# Patient Record
Sex: FEMALE | Race: WHITE | NOT HISPANIC OR LATINO
[De-identification: names, ages, dates, MRNs, and addresses within clinical notes are randomized per-mention and may not be internally consistent; named-entity substitution may affect disease eponyms.]

---

## 2017-03-30 ENCOUNTER — RX ONLY (OUTPATIENT)
Age: 42
Setting detail: RX ONLY
End: 2017-03-30

## 2017-03-30 RX ORDER — TRETINOIN 0.5 MG/G
CREAM TOPICAL
Qty: 1 | Refills: 2

## 2018-01-02 PROBLEM — F41.9 ANXIETY: Status: ACTIVE | Noted: 2018-01-02

## 2018-01-02 PROBLEM — F33.9 RECURRENT DEPRESSION (HCC): Status: ACTIVE | Noted: 2018-01-02

## 2020-02-06 PROBLEM — R10.11 RUQ PAIN: Status: ACTIVE | Noted: 2020-02-06

## 2020-02-06 PROBLEM — K80.10 CHRONIC CALCULOUS CHOLECYSTITIS: Status: ACTIVE | Noted: 2020-02-06

## 2020-02-06 PROBLEM — R74.8 ELEVATED LIVER ENZYMES: Status: ACTIVE | Noted: 2020-02-06

## 2020-02-06 PROBLEM — R93.2 ABNORMAL LIVER ULTRASOUND: Status: ACTIVE | Noted: 2020-02-06

## 2020-03-23 ENCOUNTER — HOSPITAL ENCOUNTER (INPATIENT)
Age: 45
LOS: 4 days | Discharge: HOME | DRG: 418 | End: 2020-03-27
Attending: STUDENT IN AN ORGANIZED HEALTH CARE EDUCATION/TRAINING PROGRAM | Admitting: INTERNAL MEDICINE
Payer: COMMERCIAL

## 2020-03-23 ENCOUNTER — APPOINTMENT (OUTPATIENT)
Dept: CT IMAGING | Age: 45
DRG: 418 | End: 2020-03-23
Attending: STUDENT IN AN ORGANIZED HEALTH CARE EDUCATION/TRAINING PROGRAM
Payer: COMMERCIAL

## 2020-03-23 ENCOUNTER — ANESTHESIA EVENT (OUTPATIENT)
Dept: ENDOSCOPY | Age: 45
DRG: 418 | End: 2020-03-23
Payer: COMMERCIAL

## 2020-03-23 ENCOUNTER — ANESTHESIA (OUTPATIENT)
Dept: ENDOSCOPY | Age: 45
DRG: 418 | End: 2020-03-23
Payer: COMMERCIAL

## 2020-03-23 ENCOUNTER — APPOINTMENT (OUTPATIENT)
Dept: ULTRASOUND IMAGING | Age: 45
DRG: 418 | End: 2020-03-23
Attending: STUDENT IN AN ORGANIZED HEALTH CARE EDUCATION/TRAINING PROGRAM
Payer: COMMERCIAL

## 2020-03-23 ENCOUNTER — APPOINTMENT (OUTPATIENT)
Dept: GENERAL RADIOLOGY | Age: 45
DRG: 418 | End: 2020-03-23
Attending: INTERNAL MEDICINE
Payer: COMMERCIAL

## 2020-03-23 DIAGNOSIS — K85.10 ACUTE BILIARY PANCREATITIS, UNSPECIFIED COMPLICATION STATUS: Primary | ICD-10-CM

## 2020-03-23 DIAGNOSIS — R11.0 NAUSEA WITHOUT VOMITING: ICD-10-CM

## 2020-03-23 DIAGNOSIS — K80.50 CHOLEDOCHOLITHIASIS: ICD-10-CM

## 2020-03-23 LAB
ALBUMIN SERPL-MCNC: 4.3 G/DL (ref 3.5–5)
ALBUMIN/GLOB SERPL: 1 {RATIO} (ref 1.2–3.5)
ALP SERPL-CCNC: 378 U/L (ref 50–136)
ALT SERPL-CCNC: 1279 U/L (ref 12–65)
ANION GAP SERPL CALC-SCNC: 8 MMOL/L (ref 7–16)
AST SERPL-CCNC: 1019 U/L (ref 15–37)
BASOPHILS # BLD: 0 K/UL (ref 0–0.2)
BASOPHILS NFR BLD: 0 % (ref 0–2)
BILIRUB SERPL-MCNC: 2.9 MG/DL (ref 0.2–1.1)
BUN SERPL-MCNC: 11 MG/DL (ref 6–23)
CALCIUM SERPL-MCNC: 9.4 MG/DL (ref 8.3–10.4)
CHLORIDE SERPL-SCNC: 103 MMOL/L (ref 98–107)
CO2 SERPL-SCNC: 28 MMOL/L (ref 21–32)
CREAT SERPL-MCNC: 0.96 MG/DL (ref 0.6–1)
DIFFERENTIAL METHOD BLD: ABNORMAL
EOSINOPHIL # BLD: 0 K/UL (ref 0–0.8)
EOSINOPHIL NFR BLD: 0 % (ref 0.5–7.8)
ERYTHROCYTE [DISTWIDTH] IN BLOOD BY AUTOMATED COUNT: 12.2 % (ref 11.9–14.6)
GLOBULIN SER CALC-MCNC: 4.1 G/DL (ref 2.3–3.5)
GLUCOSE SERPL-MCNC: 185 MG/DL (ref 65–100)
HCT VFR BLD AUTO: 42.5 % (ref 35.8–46.3)
HGB BLD-MCNC: 13.9 G/DL (ref 11.7–15.4)
IMM GRANULOCYTES # BLD AUTO: 0.1 K/UL (ref 0–0.5)
IMM GRANULOCYTES NFR BLD AUTO: 1 % (ref 0–5)
LIPASE SERPL-CCNC: ABNORMAL U/L (ref 73–393)
LYMPHOCYTES # BLD: 0.9 K/UL (ref 0.5–4.6)
LYMPHOCYTES NFR BLD: 7 % (ref 13–44)
MAGNESIUM SERPL-MCNC: 2.2 MG/DL (ref 1.8–2.4)
MCH RBC QN AUTO: 28.9 PG (ref 26.1–32.9)
MCHC RBC AUTO-ENTMCNC: 32.7 G/DL (ref 31.4–35)
MCV RBC AUTO: 88.4 FL (ref 79.6–97.8)
MONOCYTES # BLD: 0.6 K/UL (ref 0.1–1.3)
MONOCYTES NFR BLD: 4 % (ref 4–12)
NEUTS SEG # BLD: 11.8 K/UL (ref 1.7–8.2)
NEUTS SEG NFR BLD: 89 % (ref 43–78)
NRBC # BLD: 0 K/UL (ref 0–0.2)
PLATELET # BLD AUTO: 424 K/UL (ref 150–450)
PMV BLD AUTO: 9.3 FL (ref 9.4–12.3)
POTASSIUM SERPL-SCNC: 3.8 MMOL/L (ref 3.5–5.1)
PROT SERPL-MCNC: 8.4 G/DL (ref 6.3–8.2)
RBC # BLD AUTO: 4.81 M/UL (ref 4.05–5.2)
SODIUM SERPL-SCNC: 139 MMOL/L (ref 136–145)
WBC # BLD AUTO: 13.4 K/UL (ref 4.3–11.1)

## 2020-03-23 PROCEDURE — 76705 ECHO EXAM OF ABDOMEN: CPT

## 2020-03-23 PROCEDURE — 74011000258 HC RX REV CODE- 258: Performed by: STUDENT IN AN ORGANIZED HEALTH CARE EDUCATION/TRAINING PROGRAM

## 2020-03-23 PROCEDURE — 76060000032 HC ANESTHESIA 0.5 TO 1 HR: Performed by: INTERNAL MEDICINE

## 2020-03-23 PROCEDURE — 83690 ASSAY OF LIPASE: CPT

## 2020-03-23 PROCEDURE — 81003 URINALYSIS AUTO W/O SCOPE: CPT

## 2020-03-23 PROCEDURE — BF141ZZ FLUOROSCOPY OF GALLBLADDER, BILE DUCTS AND PANCREATIC DUCTS USING LOW OSMOLAR CONTRAST: ICD-10-PCS | Performed by: INTERNAL MEDICINE

## 2020-03-23 PROCEDURE — 74011000250 HC RX REV CODE- 250: Performed by: NURSE ANESTHETIST, CERTIFIED REGISTERED

## 2020-03-23 PROCEDURE — 83735 ASSAY OF MAGNESIUM: CPT

## 2020-03-23 PROCEDURE — 77030012595 HC SPHNTOM BILI BSC -D: Performed by: INTERNAL MEDICINE

## 2020-03-23 PROCEDURE — 77030007288 HC DEV LOK BILI BSC -A: Performed by: INTERNAL MEDICINE

## 2020-03-23 PROCEDURE — 74011250636 HC RX REV CODE- 250/636: Performed by: NURSE ANESTHETIST, CERTIFIED REGISTERED

## 2020-03-23 PROCEDURE — 77030037088 HC TUBE ENDOTRACH ORAL NSL COVD-A: Performed by: ANESTHESIOLOGY

## 2020-03-23 PROCEDURE — 77030009038 HC CATH BILI STN RTVR BSC -C: Performed by: INTERNAL MEDICINE

## 2020-03-23 PROCEDURE — 74011250636 HC RX REV CODE- 250/636: Performed by: INTERNAL MEDICINE

## 2020-03-23 PROCEDURE — 74011250636 HC RX REV CODE- 250/636: Performed by: STUDENT IN AN ORGANIZED HEALTH CARE EDUCATION/TRAINING PROGRAM

## 2020-03-23 PROCEDURE — 96375 TX/PRO/DX INJ NEW DRUG ADDON: CPT

## 2020-03-23 PROCEDURE — 80053 COMPREHEN METABOLIC PANEL: CPT

## 2020-03-23 PROCEDURE — 96365 THER/PROPH/DIAG IV INF INIT: CPT

## 2020-03-23 PROCEDURE — 77030040361 HC SLV COMPR DVT MDII -B: Performed by: INTERNAL MEDICINE

## 2020-03-23 PROCEDURE — 76040000026: Performed by: INTERNAL MEDICINE

## 2020-03-23 PROCEDURE — 99284 EMERGENCY DEPT VISIT MOD MDM: CPT

## 2020-03-23 PROCEDURE — 77030039425 HC BLD LARYNG TRULITE DISP TELE -A: Performed by: ANESTHESIOLOGY

## 2020-03-23 PROCEDURE — 74011636320 HC RX REV CODE- 636/320: Performed by: STUDENT IN AN ORGANIZED HEALTH CARE EDUCATION/TRAINING PROGRAM

## 2020-03-23 PROCEDURE — 85025 COMPLETE CBC W/AUTO DIFF WBC: CPT

## 2020-03-23 PROCEDURE — 74011250637 HC RX REV CODE- 250/637: Performed by: INTERNAL MEDICINE

## 2020-03-23 PROCEDURE — 65270000029 HC RM PRIVATE

## 2020-03-23 PROCEDURE — 74177 CT ABD & PELVIS W/CONTRAST: CPT

## 2020-03-23 PROCEDURE — 74011636320 HC RX REV CODE- 636/320: Performed by: INTERNAL MEDICINE

## 2020-03-23 PROCEDURE — 74330 X-RAY BILE/PANC ENDOSCOPY: CPT

## 2020-03-23 PROCEDURE — 0FC98ZZ EXTIRPATION OF MATTER FROM COMMON BILE DUCT, VIA NATURAL OR ARTIFICIAL OPENING ENDOSCOPIC: ICD-10-PCS | Performed by: INTERNAL MEDICINE

## 2020-03-23 RX ORDER — SODIUM CHLORIDE 0.9 % (FLUSH) 0.9 %
10 SYRINGE (ML) INJECTION
Status: COMPLETED | OUTPATIENT
Start: 2020-03-23 | End: 2020-03-23

## 2020-03-23 RX ORDER — LORAZEPAM 2 MG/ML
1 INJECTION INTRAMUSCULAR
Status: DISCONTINUED | OUTPATIENT
Start: 2020-03-23 | End: 2020-03-27 | Stop reason: HOSPADM

## 2020-03-23 RX ORDER — SUCCINYLCHOLINE CHLORIDE 20 MG/ML
INJECTION INTRAMUSCULAR; INTRAVENOUS AS NEEDED
Status: DISCONTINUED | OUTPATIENT
Start: 2020-03-23 | End: 2020-03-23 | Stop reason: HOSPADM

## 2020-03-23 RX ORDER — SODIUM CHLORIDE 0.9 % (FLUSH) 0.9 %
5-40 SYRINGE (ML) INJECTION EVERY 8 HOURS
Status: DISCONTINUED | OUTPATIENT
Start: 2020-03-23 | End: 2020-03-27 | Stop reason: HOSPADM

## 2020-03-23 RX ORDER — SODIUM CHLORIDE, SODIUM LACTATE, POTASSIUM CHLORIDE, CALCIUM CHLORIDE 600; 310; 30; 20 MG/100ML; MG/100ML; MG/100ML; MG/100ML
1000 INJECTION, SOLUTION INTRAVENOUS CONTINUOUS
Status: DISCONTINUED | OUTPATIENT
Start: 2020-03-23 | End: 2020-03-23 | Stop reason: HOSPADM

## 2020-03-23 RX ORDER — HYDROMORPHONE HYDROCHLORIDE 1 MG/ML
1 INJECTION, SOLUTION INTRAMUSCULAR; INTRAVENOUS; SUBCUTANEOUS
Status: DISCONTINUED | OUTPATIENT
Start: 2020-03-23 | End: 2020-03-25

## 2020-03-23 RX ORDER — SODIUM CHLORIDE 0.9 % (FLUSH) 0.9 %
5-40 SYRINGE (ML) INJECTION AS NEEDED
Status: DISCONTINUED | OUTPATIENT
Start: 2020-03-23 | End: 2020-03-27 | Stop reason: HOSPADM

## 2020-03-23 RX ORDER — MORPHINE SULFATE 4 MG/ML
4 INJECTION INTRAVENOUS
Status: COMPLETED | OUTPATIENT
Start: 2020-03-23 | End: 2020-03-23

## 2020-03-23 RX ORDER — PANTOPRAZOLE SODIUM 40 MG/1
40 TABLET, DELAYED RELEASE ORAL
Status: DISCONTINUED | OUTPATIENT
Start: 2020-03-23 | End: 2020-03-27 | Stop reason: HOSPADM

## 2020-03-23 RX ORDER — SODIUM CHLORIDE 9 MG/ML
100 INJECTION, SOLUTION INTRAVENOUS CONTINUOUS
Status: DISCONTINUED | OUTPATIENT
Start: 2020-03-23 | End: 2020-03-24

## 2020-03-23 RX ORDER — AMOXICILLIN 250 MG
1 CAPSULE ORAL DAILY
Status: DISCONTINUED | OUTPATIENT
Start: 2020-03-24 | End: 2020-03-27 | Stop reason: HOSPADM

## 2020-03-23 RX ORDER — METRONIDAZOLE 500 MG/100ML
500 INJECTION, SOLUTION INTRAVENOUS ONCE
Status: COMPLETED | OUTPATIENT
Start: 2020-03-23 | End: 2020-03-23

## 2020-03-23 RX ORDER — ONDANSETRON 2 MG/ML
4 INJECTION INTRAMUSCULAR; INTRAVENOUS
Status: DISCONTINUED | OUTPATIENT
Start: 2020-03-23 | End: 2020-03-27 | Stop reason: HOSPADM

## 2020-03-23 RX ORDER — DEXAMETHASONE SODIUM PHOSPHATE 4 MG/ML
INJECTION, SOLUTION INTRA-ARTICULAR; INTRALESIONAL; INTRAMUSCULAR; INTRAVENOUS; SOFT TISSUE AS NEEDED
Status: DISCONTINUED | OUTPATIENT
Start: 2020-03-23 | End: 2020-03-23 | Stop reason: HOSPADM

## 2020-03-23 RX ORDER — HYDROMORPHONE HYDROCHLORIDE 1 MG/ML
1 INJECTION, SOLUTION INTRAMUSCULAR; INTRAVENOUS; SUBCUTANEOUS
Status: COMPLETED | OUTPATIENT
Start: 2020-03-23 | End: 2020-03-23

## 2020-03-23 RX ORDER — PROPOFOL 10 MG/ML
INJECTION, EMULSION INTRAVENOUS AS NEEDED
Status: DISCONTINUED | OUTPATIENT
Start: 2020-03-23 | End: 2020-03-23 | Stop reason: HOSPADM

## 2020-03-23 RX ORDER — ONDANSETRON 2 MG/ML
4 INJECTION INTRAMUSCULAR; INTRAVENOUS
Status: COMPLETED | OUTPATIENT
Start: 2020-03-23 | End: 2020-03-23

## 2020-03-23 RX ORDER — FENTANYL CITRATE 50 UG/ML
INJECTION, SOLUTION INTRAMUSCULAR; INTRAVENOUS AS NEEDED
Status: DISCONTINUED | OUTPATIENT
Start: 2020-03-23 | End: 2020-03-23 | Stop reason: HOSPADM

## 2020-03-23 RX ORDER — LIDOCAINE HYDROCHLORIDE 20 MG/ML
INJECTION, SOLUTION EPIDURAL; INFILTRATION; INTRACAUDAL; PERINEURAL AS NEEDED
Status: DISCONTINUED | OUTPATIENT
Start: 2020-03-23 | End: 2020-03-23 | Stop reason: HOSPADM

## 2020-03-23 RX ORDER — ONDANSETRON 2 MG/ML
INJECTION INTRAMUSCULAR; INTRAVENOUS AS NEEDED
Status: DISCONTINUED | OUTPATIENT
Start: 2020-03-23 | End: 2020-03-23 | Stop reason: HOSPADM

## 2020-03-23 RX ADMIN — Medication 10 ML: at 22:49

## 2020-03-23 RX ADMIN — ONDANSETRON 4 MG: 2 INJECTION INTRAMUSCULAR; INTRAVENOUS at 10:00

## 2020-03-23 RX ADMIN — HYDROMORPHONE HYDROCHLORIDE 1 MG: 1 INJECTION, SOLUTION INTRAMUSCULAR; INTRAVENOUS; SUBCUTANEOUS at 12:54

## 2020-03-23 RX ADMIN — METRONIDAZOLE 500 MG: 500 INJECTION, SOLUTION INTRAVENOUS at 16:14

## 2020-03-23 RX ADMIN — FENTANYL CITRATE 50 MCG: 50 INJECTION INTRAMUSCULAR; INTRAVENOUS at 14:19

## 2020-03-23 RX ADMIN — IOPAMIDOL 5 ML: 755 INJECTION, SOLUTION INTRAVENOUS at 14:32

## 2020-03-23 RX ADMIN — SODIUM CHLORIDE 100 ML: 900 INJECTION, SOLUTION INTRAVENOUS at 11:44

## 2020-03-23 RX ADMIN — SODIUM CHLORIDE, SODIUM LACTATE, POTASSIUM CHLORIDE, AND CALCIUM CHLORIDE 1000 ML: 600; 310; 30; 20 INJECTION, SOLUTION INTRAVENOUS at 13:53

## 2020-03-23 RX ADMIN — ONDANSETRON 4 MG: 2 INJECTION INTRAMUSCULAR; INTRAVENOUS at 14:19

## 2020-03-23 RX ADMIN — MORPHINE SULFATE 4 MG: 4 INJECTION INTRAVENOUS at 10:00

## 2020-03-23 RX ADMIN — CEFTRIAXONE 2 G: 2 INJECTION, POWDER, FOR SOLUTION INTRAMUSCULAR; INTRAVENOUS at 13:10

## 2020-03-23 RX ADMIN — LIDOCAINE HYDROCHLORIDE 100 MG: 20 INJECTION, SOLUTION EPIDURAL; INFILTRATION; INTRACAUDAL; PERINEURAL at 14:10

## 2020-03-23 RX ADMIN — SUCCINYLCHOLINE CHLORIDE 140 MG: 20 INJECTION, SOLUTION INTRAMUSCULAR; INTRAVENOUS at 14:10

## 2020-03-23 RX ADMIN — FENTANYL CITRATE 50 MCG: 50 INJECTION INTRAMUSCULAR; INTRAVENOUS at 14:10

## 2020-03-23 RX ADMIN — PROPOFOL 150 MG: 10 INJECTION, EMULSION INTRAVENOUS at 14:10

## 2020-03-23 RX ADMIN — SODIUM CHLORIDE 1000 ML: 900 INJECTION, SOLUTION INTRAVENOUS at 10:00

## 2020-03-23 RX ADMIN — Medication 10 ML: at 11:44

## 2020-03-23 RX ADMIN — DEXAMETHASONE SODIUM PHOSPHATE 4 MG: 4 INJECTION, SOLUTION INTRAMUSCULAR; INTRAVENOUS at 14:19

## 2020-03-23 RX ADMIN — LORAZEPAM 1 MG: 2 INJECTION, SOLUTION INTRAMUSCULAR; INTRAVENOUS at 22:59

## 2020-03-23 RX ADMIN — Medication 10 ML: at 16:31

## 2020-03-23 RX ADMIN — IOPAMIDOL 100 ML: 755 INJECTION, SOLUTION INTRAVENOUS at 11:44

## 2020-03-23 RX ADMIN — PANTOPRAZOLE SODIUM 40 MG: 40 TABLET, DELAYED RELEASE ORAL at 18:54

## 2020-03-23 RX ADMIN — SODIUM CHLORIDE 200 ML/HR: 900 INJECTION, SOLUTION INTRAVENOUS at 16:14

## 2020-03-23 NOTE — PROGRESS NOTES
TRANSFER - IN REPORT:    Verbal report received from April, RN (name) on Rodrigo Natarajan  being received from ER room 17 (unit) for ordered procedure      Report consisted of patients Situation, Background, Assessment and   Recommendations(SBAR). Information from the following report(s) SBAR was reviewed with the receiving nurse. Opportunity for questions and clarification was provided. Awaiting transport to bring pt to the GI Lab at this time.

## 2020-03-23 NOTE — PROCEDURES
Gastroenterology Procedure Note           Procedure:  Endoscopic Retrograde Cholangiopancreatogram    Date of Procedure:  3/23/2020    Patient:  Charito Mckeon     1975    Sedation:  General anesthesia    Pre-Procedure Physical Exam:    Mental status:  alert and oriented  Airway:  normal oropharyngeal airway and neck mobility  CV:  regular rate and rhythm  Respiratory:  clear to auscultation    Procedure:  A History and Physical has been performed, and patient medication allergies have been  reviewed. Risks of pancreatitis, perforation, hemorrhage, adverse drug reaction, and aspiration were discussed. After obtaining informed consent, the patient was sedated, intubated and placed in prone position on the fluoroscopy table. The FPR215 duodenoscope was passed under indirect technique to the oropharynx and gently passed into the duodenum. Only partial views of the stomach and duodenum were obtained. After the procedure, the patient was taken to the recovery area in stable condition. Findings:     AMPULLA: Normal, small. BILE DUCT: This was cannulated using a Jagtome RX 44 sphincterotome over a 0.035 wire. This allowed prompt biliary cannulation with a wire-guided technique. Initial cholangiogram revealed 2 small filling defects c/w stones in the proximal common bile duct. A biliary sphincterotomy was performed over a guidewire without complications. The tome was exchanged for a 9-12 mm extraction balloon. Multiple sweeps were performed and the stones were removed. Occlusion cholangiogram revealed no further filling defects and a mildly dilated CBD. PANCREATIC DUCT: Pancreatogram was intentionally not performed. Specimen:  No    Estimated Blood Loss:  Minimal    Implant:  Non3           Impression:  1. Choledocholithiasis      Plan:  1. Treat for pancreatitis  2. CCY  3. IVF  4.  Pain control      Signed:  Angelica Soler MD  3/23/2020  2:35 PM

## 2020-03-23 NOTE — PROGRESS NOTES
TRANSFER - IN REPORT:    Verbal report received from Shar Wright RN on [employer]  being received from PACU for routine post-op procedure. Report consisted of patients Situation, Background, Assessment and   Recommendations(SBAR). Information from the following report(s) SBAR was reviewed with the receiving nurse. Opportunity for questions and clarification was provided. Assessment completed upon patients arrival to unit and care assumed.

## 2020-03-23 NOTE — PROGRESS NOTES
03/23/20 1614   Dual Skin Pressure Injury Assessment   Dual Skin Pressure Injury Assessment WDL   Second Care Provider (Based on [address] [place] [address]) Karen Avina RN     Dual skin assessment completed.  No scars, lacerations, abrasion, rash, or redness noted

## 2020-03-23 NOTE — ANESTHESIA PREPROCEDURE EVALUATION
Relevant Problems   No relevant active problems       Anesthetic History               Review of Systems / Medical History  Patient summary reviewed and pertinent labs reviewed    Pulmonary                   Neuro/Psych              Cardiovascular                  Exercise tolerance: >4 METS     GI/Hepatic/Renal     GERD: well controlled           Endo/Other             Other Findings              Physical Exam    Airway  Mallampati: I  TM Distance: > 6 cm  Neck ROM: normal range of motion   Mouth opening: Normal     Cardiovascular  Regular rate and rhythm,  S1 and S2 normal,  no murmur, click, rub, or gallop  Rhythm: regular  Rate: normal         Dental  No notable dental hx       Pulmonary  Breath sounds clear to auscultation               Abdominal         Other Findings            Anesthetic Plan    ASA: 1  Anesthesia type: general            Anesthetic plan and risks discussed with: Patient

## 2020-03-23 NOTE — CONSULTS
[facility] Consult Note       Referring Physician:  Ryan Spencer    Consult Date:  3/23/2020    Admit Date:  3/23/2020    Chief Complaint:  Abdominal pain     Subjective:     History of Present Illness:  Patient is a 38 y.o. female who is seen in consultation at the request of Dr. Ryan Spencer for abdominal pain. Pain began yesterday. Pain is periumbilical in location. Pain described as sharp. Pain associated with n/v. Pain quantified as severe. Associated with lightening of stool. Associated with loss of appetite as well. No history of pancreatitis. No recent medication changes. We are asked to evaluate her further. PMH:  Past Medical History:   Diagnosis Date    Anxiety     Depression     Insomnia        PSH:  History reviewed. No pertinent surgical history. Allergies:  No Known Allergies    Home Medications:  Prior to Admission medications    Medication Sig Start Date End Date Taking? Authorizing Provider   FLUoxetine (PROZAC) 10 mg capsule Take  by mouth daily. Provider, Historical   mirtazapine (REMERON) 30 mg tablet Take  by mouth nightly. Provider, Historical   atorvastatin (LIPITOR) 40 mg tablet Take  by mouth daily. Provider, Historical   zolpidem (AMBIEN) 10 mg tablet Take 1 Tab by mouth nightly as needed for Sleep. Max Daily Amount: 10 mg. 1/22/18   Reny Goel MD   LORazepam (ATIVAN) 1 mg tablet Take 1 Tab by mouth two (2) times daily as needed for Anxiety. Max Daily Amount: 2 mg. 1/3/18   Reny Goel MD       [facility] Medications:  Current Facility-Administered Medications   Medication Dose Route Frequency    HYDROmorphone (PF) (DILAUDID) injection 1 mg  1 mg IntraVENous NOW    piperacillin-tazobactam (ZOSYN) 4.5 g in 0.9% sodium chloride (MBP/ADV) 100 mL  4.5 g IntraVENous NOW     Current Outpatient Medications   Medication Sig    FLUoxetine (PROZAC) 10 mg capsule Take  by mouth daily.  mirtazapine (REMERON) 30 mg tablet Take  by mouth nightly.     atorvastatin (LIPITOR) 40 mg tablet Take  by mouth daily.  zolpidem (AMBIEN) 10 mg tablet Take 1 Tab by mouth nightly as needed for Sleep. Max Daily Amount: 10 mg.    LORazepam (ATIVAN) 1 mg tablet Take 1 Tab by mouth two (2) times daily as needed for Anxiety. Max Daily Amount: 2 mg. Social History:  Social History     Tobacco Use    Smoking status: Never Smoker    Smokeless tobacco: Never Used   Substance Use Topics    Alcohol use: No     Pt denies any history of IV drug use, blood transfusions, or tattoos. Family History:  History reviewed. No pertinent family history. Review of Systems:  Review of Systems - General ROS: negative for - chills, hot flashes or night sweats  Hematological and Lymphatic ROS: negative for - bleeding problems, blood transfusions or fatigue  Cardiovascular ROS: negative for - chest pain, irregular heartbeat or loss of consciousness  Gastrointestinal ROS: positive for - abdominal pain, appetite loss and nausea/vomiting  negative for - diarrhea, hematemesis or melena  Genito-Urinary ROS: no dysuria, trouble voiding, or hematuria  Dermatological ROS: negative for - acne, eczema or hair changes    A full 10 point ROS was completed and all other systems have been reviewed and are negative except those mentioned above and in the  HPI    Diet:      Objective:     Physical Exam:  Vitals:  Visit Vitals  BP (!) 136/96   Pulse 88   Temp 97.7 °F (36.5 °C)   Resp 16   Ht 5' 5\" (1.651 m)   Wt 77.1 kg (170 lb)   SpO2 96%   BMI 28.29 kg/m²     Gen:  Pt is alert, cooperative, no acute distress  Skin:  Extremities and face reveal no rashes. No palmar erythema. No telangiectasias on the chest wall. Normal texture/turgor. Warm and Dry. HEENT: Sclerae anicteric. Extra-occular muscles are intact. No oral ulcers. No abnormal pigmentation of the lips. The neck is supple. Cardiovascular: Regular rate and rhythm. No murmurs, gallops, or rubs.  Non displaced PMI  Respiratory:  Comfortable breathing with no accessory muscle use. Clear to auscultation and percussion bilaterally. GI:  Abdomen nondistended, soft, and nontender. Normal active bowel sounds. No enlargement of the liver or spleen. No masses palpable. Rectal:  Deferred  Musculoskeletal:  No pitting edema of the lower legs. Extremities have good range of motion. No costovertebral tenderness. Neurological:  Gross memory appears intact. Patient is alert and oriented. CN2-12 grossly intact, no focal deficits. Normal gait  Psychiatric: Normal speech and affect, normal judgement. Laboratory:    Recent Labs     03/23/20  0923   WBC 13.4*   HGB 13.9   HCT 42.5      MCV 88.4      K 3.8      CO2 28   BUN 11   CREA 0.96   CA 9.4   MG 2.2   *   *   SGOT 1,019*   ALB 4.3   TP 8.4*   LPSE 26,201*        Radiology:  Ct Abd Pelv W Cont    Result Date: 3/23/2020  IMPRESSION: 1. Pancreatitis with no evidence of associated mass or abscess. 2. Gallstones, borderline common bile duct dilatation. 3. Wall thickening of majority of large bowel is most likely underdistention artifact (although low-grade colitis could have a similar appearance). 4. Small hiatal hernia. GI details are limited without oral contrast.    [address] [place] [address]    Result Date: 3/23/2020  IMPRESSION: 1. Gallstones. 2. No biliary dilatation.          Assessment:     Abdominal pain  Jaundice  Abnormal Imaging of GI tract  Suspected biliary pancreatitis        Plan:       Pain conrol  ERCP   IVF  Will need cholecystectomy  Would recommend liver biopsy at the time of ccy to evaluate fatty liver      Gloria Davies MD  [facility], [place]

## 2020-03-23 NOTE — ROUTINE PROCESS
Patient received from ER. Consent for ERCP obtained and placed on chart at this time. Pt signed refusal of a pregnancy test and placed this document on chart.

## 2020-03-23 NOTE — ANESTHESIA POSTPROCEDURE EVALUATION
Procedure(s):  ENDOSCOPIC RETROGRADE CHOLANGIOPANCREATOGRAPHY (ERCP)  ENDOSCOPIC SPHINCTEROTOMY  ENDOSCOPIC STONE EXTRACTION/BALLOON SWEEP. general    Anesthesia Post Evaluation      Multimodal analgesia: multimodal analgesia not used between 6 hours prior to anesthesia start to PACU discharge  Patient location during evaluation: PACU  Patient participation: complete - patient participated  Level of consciousness: awake and alert  Pain management: adequate  Airway patency: patent  Anesthetic complications: no  Cardiovascular status: hemodynamically stable  Respiratory status: acceptable  Hydration status: acceptable        Vitals Value Taken Time   /87 3/23/2020  3:26 PM   Temp 36.3 °C (97.4 °F) 3/23/2020  3:10 PM   Pulse 92 3/23/2020  3:30 PM   Resp 12 3/23/2020  3:10 PM   SpO2 96 % 3/23/2020  3:30 PM   Vitals shown include unvalidated device data.

## 2020-03-23 NOTE — ED PROVIDER NOTES
54-year-old female patient presents to the emergency department with reports of generalized abdominal pain, multiple episodes of nonbilious, nonbloody vomiting. Patient states symptoms started 10 hours ago. She reports constant pain since onset without obvious alleviating or exacerbating factors. Patient isolates pain to the lower abdominal quadrants and periumbilical region. This pain does not radiate. Patient denies previously similar symptoms. No known sick contacts. Denies fever, chills, chest pain or shortness of breath. She reports a history of \"fatty liver\". She denies previous surgeries on her abdomen. She denies any change in urinary habits, describes her bowel movements as claylike in appearance. She denies any black bloody or tarry appearing stools. She denies any vaginal bleeding or discharge. Past Medical History:   Diagnosis Date    Anxiety     Depression     Insomnia        History reviewed. No pertinent surgical history. History reviewed. No pertinent family history.     Social History     Socioeconomic History    Marital status:      Spouse name: Not on file    Number of children: Not on file    Years of education: Not on file    Highest education level: Not on file   Occupational History    Not on file   Social Needs    Financial resource strain: Not on file    Food insecurity     Worry: Not on file     Inability: Not on file    Transportation needs     Medical: Not on file     Non-medical: Not on file   Tobacco Use    Smoking status: Never Smoker    Smokeless tobacco: Never Used   Substance and Sexual Activity    Alcohol use: No    Drug use: No    Sexual activity: Not on file   Lifestyle    Physical activity     Days per week: Not on file     Minutes per session: Not on file    Stress: Not on file   Relationships    Social connections     Talks on phone: Not on file     Gets together: Not on file     Attends Zoroastrianism service: Not on file Active member of club or organization: Not on file     Attends meetings of clubs or organizations: Not on file     Relationship status: Not on file    Intimate partner violence     Fear of current or ex partner: Not on file     Emotionally abused: Not on file     Physically abused: Not on file     Forced sexual activity: Not on file   Other Topics Concern    Not on file   Social History Narrative    Not on file         ALLERGIES: Patient has no known allergies. Review of Systems   Constitutional: Negative for chills, diaphoresis and fever. HENT: Negative for congestion, sneezing and sore throat. Eyes: Negative for visual disturbance. Respiratory: Negative for cough, chest tightness, shortness of breath and wheezing. Cardiovascular: Negative for chest pain and leg swelling. Gastrointestinal: Positive for abdominal pain, nausea and vomiting. Negative for blood in stool and diarrhea. Endocrine: Negative for polyuria. Genitourinary: Negative for difficulty urinating, dysuria, flank pain, hematuria and urgency. Musculoskeletal: Negative for back pain, myalgias, neck pain and neck stiffness. Skin: Negative for color change and rash. Neurological: Negative for dizziness, syncope, speech difficulty, weakness, light-headedness, numbness and headaches. Psychiatric/Behavioral: Negative for behavioral problems. All other systems reviewed and are negative. Vitals:    03/23/20 0918   BP: (!) 136/96   Pulse: 88   Resp: 16   Temp: 97.7 °F (36.5 °C)   SpO2: 96%   Weight: 77.1 kg (170 lb)   Height: 5' 5\" (1.651 m)            Physical Exam  Vitals signs and nursing note reviewed. Constitutional:       General: She is not in acute distress. Appearance: She is well-developed. She is not diaphoretic. Comments: Alert and oriented to person place and time. No acute distress, speaks in clear, fluid sentences. HENT:      Head: Normocephalic and atraumatic.       Right Ear: External ear normal.      Left Ear: External ear normal.      Nose: Nose normal.   Eyes:      Pupils: Pupils are equal, round, and reactive to light. Neck:      Musculoskeletal: Normal range of motion. Cardiovascular:      Rate and Rhythm: Normal rate and regular rhythm. Heart sounds: Normal heart sounds. No murmur. No friction rub. No gallop. Pulmonary:      Effort: Pulmonary effort is normal. No respiratory distress. Breath sounds: Normal breath sounds. No stridor. No decreased breath sounds, wheezing, rhonchi or rales. Chest:      Chest wall: No tenderness. Abdominal:      General: There is no distension. Palpations: Abdomen is soft. There is no mass. Tenderness: There is no abdominal tenderness. There is no guarding or rebound. Hernia: No hernia is present. Comments: No significant pain on palpation though patient reports subjective discomfort with palpation of the periumbilical, right lower quadrant. Musculoskeletal: Normal range of motion. General: No tenderness or deformity. Skin:     General: Skin is warm and dry. Coloration: Skin is jaundiced. Comments: Appears to be a faint jaundiced appearance to the skin. Neurological:      Mental Status: She is alert and oriented to person, place, and time. Cranial Nerves: No cranial nerve deficit. MDM  Number of Diagnoses or Management Options  Acute biliary pancreatitis, unspecified complication status: new and requires workup  Choledocholithiasis: new and requires workup  Nausea without vomiting: new and requires workup  Diagnosis management comments: Given patient's reports of severe pain and localization to the right lower quadrant and periumbilical region, will collect CT imaging. Laboratory testing reveals significant pancreatitis with elevation patient's liver enzymes, suggesting obstructing stone. Call placed to CT to hold imaging study while we obtain ultrasound imaging.  10:36 AM    CT imaging shows a pancreatitis, slight thickening of the gallbladder wall, gallstones. I have spoken with the general surgery team who request that I admit to the hospitalist given significant pancreatitis. We will also contact GI concerning likely choledocholithiasis. GI states they will evaluate patient and likely perform ERCP tomorrow. Hospitalist team agrees to evaluate for admission. 1 dose of Zosyn ordered given patient's slight leukocytosis. Voice dictation software was used during the making of this note. This software is not perfect and grammatical and other typographical errors may be present. This note has been proofread, but may still contain errors.   [address] [facility], ; 3/23/2020 @12:46 PM   ===================================================================           Amount and/or Complexity of Data Reviewed  Clinical lab tests: ordered and reviewed  Tests in the radiology section of CPT®: ordered and reviewed  Tests in the medicine section of CPT®: ordered and reviewed  Discuss the patient with other providers: yes  Independent visualization of images, tracings, or specimens: yes    Risk of Complications, Morbidity, and/or Mortality  Presenting problems: moderate  Diagnostic procedures: moderate  Management options: moderate    Patient Progress  Patient progress: stable         Procedures

## 2020-03-23 NOTE — H&P
HOSPITALIST H&P/CONSULT  NAME:  Wilfrido Minor   Age:  38 y.o.  :   1975   MRN:   [id number]  PCP: Tiffanie Maher MD  Consulting MD:  Treatment Team: Attending Provider: Ric Ly DO; Consulting Provider: Victoria Wolff MD; Consulting Provider: Lexa Carson MD  HPI:   39 yo CF with past history of MDD/anxiety and insomnia presents with a 3-day history of worsening periumbilical abdominal pain. Pain started on Friday \"after I ate ice cream\" and then got better. Pain worsened about ten hours ago again and prompted her to come to the ER. No recent travel. She has never had this problem before. Some associated chills on and off without fevers. Some nausea without vomiting. Her pain is much controlled after getting Dilaudid in the ED. No diarrhea or constipation. No chest pain or shortness of breath. ED Course: Abdominal ultrasound showing gallstones. CT abdomen showing fatty liver and pancreatitis and possible dilated CBD. Liver enzymes in the 1000's and lipase of 26,000. GI with plans for ERCP. Given morphine, Zofran, and Dilaudid and Rocephin/Flagyl given with IVFs. Hospitalist called for admission. Complete ROS done and is as stated in HPI or otherwise negative  Past Medical History:   Diagnosis Date    Anxiety     Depression     Insomnia       History reviewed. No pertinent surgical history. Prior to Admission Medications   Prescriptions Last Dose Informant Patient Reported? Taking? FLUoxetine (PROZAC) 10 mg capsule 2020 at Unknown time  Yes Yes   Sig: Take  by mouth daily. LORazepam (ATIVAN) 1 mg tablet 3/22/2020 at Unknown time  No Yes   Sig: Take 1 Tab by mouth two (2) times daily as needed for Anxiety. Max Daily Amount: 2 mg. atorvastatin (LIPITOR) 40 mg tablet 3/22/2020 at Unknown time  Yes Yes   Sig: Take  by mouth daily. mirtazapine (REMERON) 30 mg tablet 3/22/2020 at Unknown time  Yes Yes   Sig: Take  by mouth nightly.    zolpidem (AMBIEN) 10 mg tablet 3/22/2020 at Unknown time  No Yes   Sig: Take 1 Tab by mouth nightly as needed for Sleep. Max Daily Amount: 10 mg. Facility-Administered Medications: None     No Known Allergies   Social History     Tobacco Use    Smoking status: Never Smoker    Smokeless tobacco: Never Used   Substance Use Topics    Alcohol use: No      History reviewed. No pertinent family history. Objective:     Visit Vitals  /90 (BP 1 Location: Left arm, BP Patient Position: Head of bed elevated (Comment degrees)) Comment (BP Patient Position): 45   Pulse 84   Temp 97.7 °F (36.5 °C)   Resp 15   Ht 5' 5\" (1.651 m)   Wt 77.1 kg (170 lb)   SpO2 95%   BMI 28.29 kg/m²      Temp (24hrs), Av.6 °F (36.4 °C), Min:97.4 °F (36.3 °C), Max:97.8 °F (36.6 °C)    Oxygen Therapy  O2 Sat (%): 95 % (20 1557)  Pulse via Oximetry: 87 beats per minute (20 1521)  O2 Device: Room air (20 1510)  O2 Flow Rate (L/min): 5 l/min (20 1447)  Physical Exam:  General:    Alert, cooperative, no distress, appears stated age. Head:   Normocephalic, without obvious abnormality, atraumatic. Nose:  Nares normal. No drainage or sinus tenderness. Lungs:   Clear to auscultation bilaterally. No Wheezing or Rhonchi. No rales. Heart:   Regular rate and rhythm,  no murmur, rub or gallop. Abdomen:   Soft, non-tender. Not distended. Bowel sounds normal. Negative Leblanc sign. No rebound tenderness. Extremities: No cyanosis. No edema. No clubbing  Skin:     Texture, turgor normal. No rashes or lesions.   Not Jaundiced  Neurologic: Alert and oriented x 3, no focal deficits   Data Review:   Recent Results (from the past 24 hour(s))   CBC WITH AUTOMATED DIFF    Collection Time: 20  9:23 AM   Result Value Ref Range    WBC 13.4 (H) 4.3 - 11.1 K/uL    RBC 4.81 4.05 - 5.2 M/uL    HGB 13.9 11.7 - 15.4 g/dL    HCT 42.5 35.8 - 46.3 %    MCV 88.4 79.6 - 97.8 FL    MCH 28.9 26.1 - 32.9 PG    MCHC 32.7 31.4 - 35.0 g/dL    RDW 12.2 11.9 - 14.6 % PLATELET 788 286 - 253 K/uL    MPV 9.3 (L) 9.4 - 12.3 FL    ABSOLUTE NRBC 0.00 0.0 - 0.2 K/uL    DF AUTOMATED      NEUTROPHILS 89 (H) 43 - 78 %    LYMPHOCYTES 7 (L) 13 - 44 %    MONOCYTES 4 4.0 - 12.0 %    EOSINOPHILS 0 (L) 0.5 - 7.8 %    BASOPHILS 0 0.0 - 2.0 %    IMMATURE GRANULOCYTES 1 0.0 - 5.0 %    ABS. NEUTROPHILS 11.8 (H) 1.7 - 8.2 K/UL    ABS. LYMPHOCYTES 0.9 0.5 - 4.6 K/UL    ABS. MONOCYTES 0.6 0.1 - 1.3 K/UL    ABS. EOSINOPHILS 0.0 0.0 - 0.8 K/UL    ABS. BASOPHILS 0.0 0.0 - 0.2 K/UL    ABS. IMM. GRANS. 0.1 0.0 - 0.5 K/UL   METABOLIC PANEL, COMPREHENSIVE    Collection Time: 03/23/20  9:23 AM   Result Value Ref Range    Sodium 139 136 - 145 mmol/L    Potassium 3.8 3.5 - 5.1 mmol/L    Chloride 103 98 - 107 mmol/L    CO2 28 21 - 32 mmol/L    Anion gap 8 7 - 16 mmol/L    Glucose 185 (H) 65 - 100 mg/dL    BUN 11 6 - 23 MG/DL    Creatinine 0.96 0.6 - 1.0 MG/DL    GFR est AA >60 >60 ml/min/1.73m2    GFR est non-AA >60 >60 ml/min/1.73m2    Calcium 9.4 8.3 - 10.4 MG/DL    Bilirubin, total 2.9 (H) 0.2 - 1.1 MG/DL    ALT (SGPT) 1,279 (H) 12 - 65 U/L    AST (SGOT) 1,019 (H) 15 - 37 U/L    Alk. phosphatase 378 (H) 50 - 136 U/L    Protein, total 8.4 (H) 6.3 - 8.2 g/dL    Albumin 4.3 3.5 - 5.0 g/dL    Globulin 4.1 (H) 2.3 - 3.5 g/dL    A-G Ratio 1.0 (L) 1.2 - 3.5     LIPASE    Collection Time: 03/23/20  9:23 AM   Result Value Ref Range    Lipase 26,201 (H) 73 - 393 U/L   MAGNESIUM    Collection Time: 03/23/20  9:23 AM   Result Value Ref Range    Magnesium 2.2 1.8 - 2.4 mg/dL     Imaging /Procedures /Studies     Assessment and Plan:      Active Hospital Problems    Diagnosis Date Noted    Acute gallstone pancreatitis 03/23/2020    Elevated liver enzymes 02/06/2020    Anxiety 01/02/2018       PLAN    # Acute gallstone pancreatitis  - CT evidence of pancreatitis  - GI planning ERCP  - general surgery consult for cholecystectomy after acute issues resolve  - no current concern for ascending cholangitis, hold off on antibiotics  - continue with fluids at 200 ml/hr  - supportive management  - trend LFTs    # History of MDD/anxiety  - hold while NPO    F/E/N: maintenance fluids, replete electrolytes as needed, NPO for now    Ppx: SCDs for VTE    Code Status: FULL CODE    Disposition: Admit to Inpatient with plan as above. Discussed with patient at bedside. All questions answered.      Signed By: Angely Henson, DO     March 23, 2020

## 2020-03-23 NOTE — ED TRIAGE NOTES
Pt reports abd pain for last 10 hours. Pt states vomiting. Denies diarrhea. Hx of gallstones but still have gallbladder. Pain is in lower abd. No burning with urination. Pt denies recent travel, exposure to someone who has traveled, exposure to someone who is ill, SOB, cough, or Fever.  Has mask on in triage

## 2020-03-23 NOTE — PERIOP NOTES
TRANSFER - OUT REPORT:    Verbal report given to Jorge Live RN on Steven Ortega  being transferred to [id number] for routine post - op       Report consisted of patients Situation, Background, Assessment and   Recommendations(SBAR). Information from the following report(s) Procedure Summary, Recent Results and Cardiac Rhythm NSR was reviewed with the receiving nurse. Lines:   Peripheral IV 03/23/20 Right Antecubital (Active)   Site Assessment Clean, dry, & intact 3/23/2020  3:10 PM   Phlebitis Assessment 0 3/23/2020  3:10 PM   Infiltration Assessment 0 3/23/2020  3:10 PM   Dressing Status Clean, dry, & intact 3/23/2020  3:10 PM   Dressing Type Transparent;Tape 3/23/2020  2:47 PM   Hub Color/Line Status Pink; Infusing 3/23/2020  3:10 PM        Opportunity for questions and clarification was provided. VTE prophylaxis orders have not been written for Steven Ortega. No family present with patient.

## 2020-03-23 NOTE — PROGRESS NOTES
SCDS on patient. PACU called to let them know procedure is starting. Will call back when procedure is complete.

## 2020-03-24 ENCOUNTER — ANESTHESIA EVENT (OUTPATIENT)
Dept: SURGERY | Age: 45
DRG: 418 | End: 2020-03-24
Payer: COMMERCIAL

## 2020-03-24 PROBLEM — E80.6 HYPERBILIRUBINEMIA: Status: ACTIVE | Noted: 2020-03-24

## 2020-03-24 PROBLEM — K80.50 CHOLEDOCHOLITHIASIS: Status: ACTIVE | Noted: 2020-03-24

## 2020-03-24 LAB
ALBUMIN SERPL-MCNC: 3.6 G/DL (ref 3.5–5)
ALBUMIN/GLOB SERPL: 1 {RATIO} (ref 1.2–3.5)
ALP SERPL-CCNC: 335 U/L (ref 50–136)
ALT SERPL-CCNC: 918 U/L (ref 12–65)
ANION GAP SERPL CALC-SCNC: 6 MMOL/L (ref 7–16)
AST SERPL-CCNC: 495 U/L (ref 15–37)
BASOPHILS # BLD: 0 K/UL (ref 0–0.2)
BASOPHILS NFR BLD: 0 % (ref 0–2)
BILIRUB SERPL-MCNC: 1.2 MG/DL (ref 0.2–1.1)
BUN SERPL-MCNC: 8 MG/DL (ref 6–23)
CALCIUM SERPL-MCNC: 8.6 MG/DL (ref 8.3–10.4)
CHLORIDE SERPL-SCNC: 108 MMOL/L (ref 98–107)
CO2 SERPL-SCNC: 27 MMOL/L (ref 21–32)
CREAT SERPL-MCNC: 0.78 MG/DL (ref 0.6–1)
DIFFERENTIAL METHOD BLD: ABNORMAL
EOSINOPHIL # BLD: 0 K/UL (ref 0–0.8)
EOSINOPHIL NFR BLD: 0 % (ref 0.5–7.8)
ERYTHROCYTE [DISTWIDTH] IN BLOOD BY AUTOMATED COUNT: 12.4 % (ref 11.9–14.6)
GLOBULIN SER CALC-MCNC: 3.5 G/DL (ref 2.3–3.5)
GLUCOSE SERPL-MCNC: 96 MG/DL (ref 65–100)
HCT VFR BLD AUTO: 39 % (ref 35.8–46.3)
HGB BLD-MCNC: 12.5 G/DL (ref 11.7–15.4)
IMM GRANULOCYTES # BLD AUTO: 0.1 K/UL (ref 0–0.5)
IMM GRANULOCYTES NFR BLD AUTO: 1 % (ref 0–5)
LYMPHOCYTES # BLD: 2.3 K/UL (ref 0.5–4.6)
LYMPHOCYTES NFR BLD: 12 % (ref 13–44)
MCH RBC QN AUTO: 29 PG (ref 26.1–32.9)
MCHC RBC AUTO-ENTMCNC: 32.1 G/DL (ref 31.4–35)
MCV RBC AUTO: 90.5 FL (ref 79.6–97.8)
MONOCYTES # BLD: 1.2 K/UL (ref 0.1–1.3)
MONOCYTES NFR BLD: 6 % (ref 4–12)
NEUTS SEG # BLD: 15.8 K/UL (ref 1.7–8.2)
NEUTS SEG NFR BLD: 81 % (ref 43–78)
NRBC # BLD: 0 K/UL (ref 0–0.2)
PLATELET # BLD AUTO: 365 K/UL (ref 150–450)
PMV BLD AUTO: 9.4 FL (ref 9.4–12.3)
POTASSIUM SERPL-SCNC: 3.4 MMOL/L (ref 3.5–5.1)
PROT SERPL-MCNC: 7.1 G/DL (ref 6.3–8.2)
RBC # BLD AUTO: 4.31 M/UL (ref 4.05–5.2)
SODIUM SERPL-SCNC: 141 MMOL/L (ref 136–145)
WBC # BLD AUTO: 19.5 K/UL (ref 4.3–11.1)

## 2020-03-24 PROCEDURE — 74011250636 HC RX REV CODE- 250/636: Performed by: INTERNAL MEDICINE

## 2020-03-24 PROCEDURE — 65270000029 HC RM PRIVATE

## 2020-03-24 PROCEDURE — 74011250637 HC RX REV CODE- 250/637: Performed by: INTERNAL MEDICINE

## 2020-03-24 PROCEDURE — 85025 COMPLETE CBC W/AUTO DIFF WBC: CPT

## 2020-03-24 PROCEDURE — 74011250636 HC RX REV CODE- 250/636: Performed by: SURGERY

## 2020-03-24 PROCEDURE — 80053 COMPREHEN METABOLIC PANEL: CPT

## 2020-03-24 PROCEDURE — 36415 COLL VENOUS BLD VENIPUNCTURE: CPT

## 2020-03-24 RX ORDER — CEFAZOLIN SODIUM/WATER 2 G/20 ML
2 SYRINGE (ML) INTRAVENOUS
Status: COMPLETED | OUTPATIENT
Start: 2020-03-25 | End: 2020-03-25

## 2020-03-24 RX ORDER — ACETAMINOPHEN 325 MG/1
650 TABLET ORAL
Status: DISCONTINUED | OUTPATIENT
Start: 2020-03-24 | End: 2020-03-24

## 2020-03-24 RX ORDER — SODIUM CHLORIDE, SODIUM LACTATE, POTASSIUM CHLORIDE, CALCIUM CHLORIDE 600; 310; 30; 20 MG/100ML; MG/100ML; MG/100ML; MG/100ML
150 INJECTION, SOLUTION INTRAVENOUS CONTINUOUS
Status: DISCONTINUED | OUTPATIENT
Start: 2020-03-24 | End: 2020-03-25

## 2020-03-24 RX ORDER — IBUPROFEN 400 MG/1
400 TABLET ORAL
Status: DISPENSED | OUTPATIENT
Start: 2020-03-24 | End: 2020-03-25

## 2020-03-24 RX ADMIN — Medication 10 ML: at 21:31

## 2020-03-24 RX ADMIN — SODIUM CHLORIDE 200 ML/HR: 900 INJECTION, SOLUTION INTRAVENOUS at 06:28

## 2020-03-24 RX ADMIN — Medication 10 ML: at 05:27

## 2020-03-24 RX ADMIN — SODIUM CHLORIDE, SODIUM LACTATE, POTASSIUM CHLORIDE, AND CALCIUM CHLORIDE 150 ML/HR: 600; 310; 30; 20 INJECTION, SOLUTION INTRAVENOUS at 15:49

## 2020-03-24 RX ADMIN — IBUPROFEN 400 MG: 400 TABLET, FILM COATED ORAL at 18:13

## 2020-03-24 RX ADMIN — SODIUM CHLORIDE, SODIUM LACTATE, POTASSIUM CHLORIDE, AND CALCIUM CHLORIDE 1000 ML: 600; 310; 30; 20 INJECTION, SOLUTION INTRAVENOUS at 07:11

## 2020-03-24 RX ADMIN — HYDROMORPHONE HYDROCHLORIDE 1 MG: 1 INJECTION, SOLUTION INTRAMUSCULAR; INTRAVENOUS; SUBCUTANEOUS at 18:49

## 2020-03-24 RX ADMIN — SODIUM CHLORIDE, SODIUM LACTATE, POTASSIUM CHLORIDE, AND CALCIUM CHLORIDE 150 ML/HR: 600; 310; 30; 20 INJECTION, SOLUTION INTRAVENOUS at 08:24

## 2020-03-24 RX ADMIN — SENNOSIDES AND DOCUSATE SODIUM 1 TABLET: 8.6; 5 TABLET ORAL at 08:16

## 2020-03-24 RX ADMIN — HYDROMORPHONE HYDROCHLORIDE 1 MG: 1 INJECTION, SOLUTION INTRAMUSCULAR; INTRAVENOUS; SUBCUTANEOUS at 09:32

## 2020-03-24 RX ADMIN — SODIUM CHLORIDE, SODIUM LACTATE, POTASSIUM CHLORIDE, AND CALCIUM CHLORIDE 150 ML/HR: 600; 310; 30; 20 INJECTION, SOLUTION INTRAVENOUS at 23:13

## 2020-03-24 RX ADMIN — PANTOPRAZOLE SODIUM 40 MG: 40 TABLET, DELAYED RELEASE ORAL at 08:16

## 2020-03-24 RX ADMIN — Medication 10 ML: at 13:36

## 2020-03-24 NOTE — PROGRESS NOTES
Pt was running a fever of 102.0 and pt did not have anything ordered to give for this. Notified Dr. Bunny Villa. Dr Bunny Villa placed order for motrin.

## 2020-03-24 NOTE — PROGRESS NOTES
Hourly rounding completed on this shift. Pt c/o pain twice on this shift and given medication per MAR. Pt ran fever of 102.0 on this shift and given medication per MAR. Rechecked temp and it is 100.0. All needs met. Pt is currently resting in bed. Will continue to monitor and give report to oncoming nurse.

## 2020-03-24 NOTE — PROGRESS NOTES
CM met with pt at bedside to complete assessment. Patient presented alert and oriented. Patient confirmed demographic information. The patient lives in one level home, with 3 steps to enter into the home. Patient's father lives with her and she is the primary caregiver. The patient is independent with completing ADL's. The patient receives her medications from [facility] on [address]. Patient confirmed no HH services. Patient voiced no needs at this time. Patient will likely discharge home when medically stable. Please consult or notify CM , if any needs shall arise. CM continues to follow the patient during this hospitalization. Care Management Interventions  PCP Verified by CM: Yes  Mode of Transport at Discharge: Other (see comment)(TBD: Based Upon Need. )  Transition of Care Consult (CM Consult): Discharge Planning  Discharge Durable Medical Equipment: No  Physical Therapy Consult: No  Occupational Therapy Consult: No  Speech Therapy Consult: No  Current Support Network: Own Home(Per patient, her father lives with her in a one level home. The patient is the primary caregiver for her father. )  Confirm Follow Up Transport: Self  The Plan for Transition of Care is Related to the Following Treatment Goals : Patient to obtain care to become medically stable.    The Patient and/or Patient Representative was Provided with a Choice of Provider and Agrees with the Discharge Plan?: Yes  Name of the Patient Representative Who was Provided with a Choice of Provider and Agrees with the Discharge Plan: Stevie Montanez   Freedom of Choice List was Provided with Basic Dialogue that Supports the Patient's Individualized Plan of Care/Goals, Treatment Preferences and Shares the Quality Data Associated with the Providers?: Yes  [place] Resource Information Provided?: No  Discharge Location  Discharge Placement: Home

## 2020-03-24 NOTE — PROGRESS NOTES
[facility] Progress Note         Admit Date:  3/23/2020    Today's Date:  3/24/2020    CC: Abd pain    Subjective:     Patient is feeling better today. She denies any abd pain, but states she has had bloating and fullness after eating a little too much this morning. She denies any nausea or vomiting. She has not had a BM. Medications:   Current Facility-Administered Medications   Medication Dose Route Frequency    lactated Ringers infusion  150 mL/hr IntraVENous CONTINUOUS    [START ON 3/25/2020] ceFAZolin (ANCEF) 2 g/20 mL in sterile water IV syringe  2 g IntraVENous ON CALL TO OR    sodium chloride (NS) flush 5-40 mL  5-40 mL IntraVENous Q8H    sodium chloride (NS) flush 5-40 mL  5-40 mL IntraVENous PRN    ondansetron (ZOFRAN) injection 4 mg  4 mg IntraVENous Q4H PRN    senna-docusate (PERICOLACE) 8.6-50 mg per tablet 1 Tab  1 Tab Oral DAILY    HYDROmorphone (PF) (DILAUDID) injection 1 mg  1 mg IntraVENous Q4H PRN    LORazepam (ATIVAN) injection 1 mg  1 mg IntraVENous BID PRN    pantoprazole (PROTONIX) tablet 40 mg  40 mg Oral ACB       Review of Systems:  ROS was obtained, with pertinent positives as listed above. No chest pain or SOB. Diet:  Clear liquids    Objective:   Vitals:  Visit Vitals  /74 (BP 1 Location: Left arm, BP Patient Position: At rest)   Pulse 98   Temp 98.3 °F (36.8 °C)   Resp 18   Ht 5' 5\" (1.651 m)   Wt 77.1 kg (170 lb)   SpO2 97%   BMI 28.29 kg/m²     Intake/Output:  03/24 0701 - 03/24 1900  In: 220 [P.O.:220]  Out: 800 [Urine:800]  03/22 1901 - 03/24 0700  In: 1450 [I.V.:1450]  Out: 0   Exam:  General appearance: alert, cooperative, no distress, lying in bed  Lungs: clear to auscultation bilaterally anteriorly  Heart: regular rate and rhythm  Abdomen: soft, non-tender.  Bowel sounds normal. No masses, no organomegaly  Neuro:  alert and oriented    Data Review (Labs):    Recent Labs     03/24/20  0604 03/23/20  0923   WBC 19.5* 13.4*   HGB 12.5 13.9   HCT 39.0 42.5    424   MCV 90.5 88.4    139   K 3.4* 3.8   * 103   CO2 27 28   BUN 8 11   CREA 0.78 0.96   CA 8.6 9.4   MG  --  2.2   GLU 96 185*   * 378*   SGOT 495* 1,019*   * 1,279*   TBILI 1.2* 2.9*   ALB 3.6 4.3   TP 7.1 8.4*   LPSE  --  26,201*     CT of the Abdomen and Pelvis with contrast 23 March 2020  CLINICAL INDICATION:  Acute vomiting, severe pain right and left lower  quadrants. History includes gallstones. Labs demonstrate leukocytosis,  hyperbilirubinemia, elevated liver function tests.   COMPARISON: Ultrasound abdomen performed earlier today   TECHNIQUE: Automated exposure Control was used. Multiple axial images were  obtained through the abdomen and pelvis after intravenous injection of 125cc of  isovue 370. No oral contrast given per request, limiting evaluation of GI tract  and surrounding structures. Coronal reformatted images obtained for further  evaluation of organs.   FINDINGS: Partially included lung bases are unremarkable.   Abdomen:  No suspicious lesions in the liver or spleen. Again demonstrated are  gallstones, with no evidence of surrounding inflammation. Common bile duct is  borderline dilated measuring up to 7-8 mm diameter. The adrenal glands and  pancreas demonstrate no discrete mass. There is mild to moderate inflammatory  stranding and trace fluid surrounding the majority of the pancreas, but no  evidence of an associated cystic or solid mass. Tiny round hypodensities in the  renal cortices are too small to characterize, statistically benign incidental  cysts. There is normal enhancement of the kidneys, no hydronephrosis.     Normal appendix. Majority of large bowel is decompressed, with mild to moderate  wall thickening noted throughout but no prominent surrounding inflammation. No  lymphadenopathy. No free air, or focal fluid collections in the abdomen. Aorta  normal caliber. Patent major vessels.   A small hiatal hernia is noted.  There is no evidence of bowel obstruction. GI  evaluation is limited without oral contrast.   Pelvis:  No acute inflammatory changes or fluid collections in the pelvis. Uterus and endometrium appear somewhat heterogeneous, nonspecific and could be  physiologic given patient age unless there is clinical suspicion. Uterus and  ovaries are not enlarged. Urinary bladder is unremarkable grossly. No  lymphadenopathy or mass in the pelvis.   Bones: No acute or suspicious osseous lesions.   IMPRESSION  IMPRESSION:   1. Pancreatitis with no evidence of associated mass or abscess. 2. Gallstones, borderline common bile duct dilatation. 3. Wall thickening of majority of large bowel is most likely underdistention  artifact (although low-grade colitis could have a similar appearance). 4. Small hiatal hernia.   GI details are limited without oral contrast.    Right Upper Quadrant Ultrasound 23 March 2020  INDICATION:  Acute moderate pain right upper abdomen and both lower quadrants;  with elevated lipase and elevated liver function tests, leukocytosis,  hyperbilirubinemia. COMPARISON: none  TECHNIQUE:  Sonographic gray scale and Doppler images were obtained of the right  upper quadrant of the abdomen. FINDINGS: There are no discrete lesions in the visualized portions of the liver. Liver size is 14.0 cm, within normal limits. Main portal vein is patent on Doppler imaging. There is no visualized choledocholithiasis or bile duct dilatation. The common  bile duct diameter is 3 mm. Gallbladder demonstrates multiple intraluminal  layering stones, and borderline 3 mm wall thickening but no surrounding fluid. Sonographic Leblanc's sign is not positive during the exam.  There are no discrete lesions in the limited visualized portions of the  pancreas. The right kidney measures 9.8 cm in length. There is no hydronephrosis. Color  Doppler demonstrates expected right renal flow. There is no evidence of a solid  renal mass.    There is no evidence of ascites. The right and left lower quadrant appear  grossly unremarkable as seen by ultrasound. The aorta and IVC are patent on Doppler imaging. Aortic diameter is within  normal limits. IMPRESSION  IMPRESSION:   1. Gallstones. 2. No biliary dilatation. ERCP 23 March 2020 with Dr. Markie Engel with Impression: Choledocholithiasis, sphincterotomy and stones removed. Plan:  1. Treat for pancreatitis  2. CCY  3. IVF  4. Pain control    Assessment:     Principal Problem:    Acute gallstone pancreatitis (3/23/2020)    Active Problems:    Anxiety (1/2/2018)      Chronic calculous cholecystitis (2/6/2020)      Abnormal liver ultrasound (2/6/2020)      Elevated liver enzymes (2/6/2020)      Hyperbilirubinemia (3/24/2020)      Choledocholithiasis (3/24/2020)    37 yo female with PMH of anxiety, depression, insomnia, who was seen in consultation 23 March 2020 for abd pain, with jaundice and concern for biliary pancreatitis. She underwent ERCP on 23 March 2020 with Dr. Markie Engel with choledocholithiasis noted, s/p sphincterotomy and stones removed. She has been seen by surgery and cholecystectomy pending. She is feeling better today and LFTs are improving. Leukocytosis has increased, possibly related to cholecystitis. Plan:     -Supportive care. -Clear liquids.  -On ATBXs.  -Cholecystectomy pending with surgery. -Monitor for any increase in symptoms. Patient is seen and examined in collaboration with Dr. Weldon [facility].  Assessment and plan as per Dr. Navi Go.  Dee Guevara  [facility]

## 2020-03-24 NOTE — CONSULTS
H&P/Consult Note/Progress Note/Office Note:   Penny Sauceda  MRN: [id number]  BCA:[birth year]  Age:44 y.o.    HPI: Penny Sauceda is a 38 y.o. female who originally reported that in late Jan, 2020 she developed a 9-day period of severe constant RUQ pain with radiation to her back associated with N/V. She had a liver panel on 1/27/20 with T Bili 1.2, alk phosp 236, AST 87 , WBC 16.8 although these numbers are little difficult to read from the scanned in documents. They were drawn at an outside facility. Her pain resolved eventually. She denies any prior abdominal surgery. She had the below US performed. Cholecystectomy and liver biopsy was offered and recommended in February 2020, but she did not want to proceed related to insurance coverage. She presented to the ER on 3/23/20 and was admitted after she developed a 3-day history of worsening, severe, constant, periumbilical pain which began after she ate ice cream.    Nothing in particular made her pain better or worse. She had associated nausea but no vomiting. She was found to have a markedly elevated LFTs with transaminases >1000; T bIli 2.9 and lipase 26,201      3/23/20 RUQ and US  Hx:  Acute moderate pain right upper abdomen and both lower quadrants;  with elevated lipase and elevated liver function tests, leukocytosis,  hyperbilirubinemia.     FINDINGS: There are no discrete lesions in the visualized portions of the liver. Liver size is 14.0 cm, within normal limits.     Main portal vein is patent on Doppler imaging.     There is no visualized choledocholithiasis or bile duct dilatation. The common  bile duct diameter is 3 mm. Gallbladder demonstrates multiple intraluminal  layering stones, and borderline 3 mm wall thickening but no surrounding fluid.    Sonographic Leblanc's sign is not positive during the exam.     There are no discrete lesions in the limited visualized portions of the pancreas.      The right kidney measures 9.8 cm in length. There is no hydronephrosis. Color  Doppler demonstrates expected right renal flow. There is no evidence of a solid  renal mass.      There is no evidence of ascites. The right and left lower quadrant appear  grossly unremarkable as seen by ultrasound.     The aorta and IVC are patent on Doppler imaging. Aortic diameter is within normal limits.        IMPRESSION:   1. Gallstones. 2. No biliary dilatation. 3/23/20 CT abd/pelvis with IV but no oral contrast  Hx:  Acute vomiting, severe pain right and left lower  quadrants. History includes gallstones. Labs demonstrate leukocytosis,  hyperbilirubinemia, elevated liver function tests.     FINDINGS: Partially included lung bases are unremarkable.     Abdomen:  No suspicious lesions in the liver or spleen. Again demonstrated are  gallstones, with no evidence of surrounding inflammation. Common bile duct is  borderline dilated measuring up to 7-8 mm diameter. The adrenal glands and  pancreas demonstrate no discrete mass. There is mild to moderate inflammatory  stranding and trace fluid surrounding the majority of the pancreas, but no  evidence of an associated cystic or solid mass. Tiny round hypodensities in the  renal cortices are too small to characterize, statistically benign incidental  cysts. There is normal enhancement of the kidneys, no hydronephrosis.       Normal appendix. Majority of large bowel is decompressed, with mild to moderate  wall thickening noted throughout but no prominent surrounding inflammation. No  lymphadenopathy. No free air, or focal fluid collections in the abdomen. Aorta  normal caliber. Patent major vessels.     A small hiatal hernia is noted. There is no evidence of bowel obstruction. GI  evaluation is limited without oral contrast.     Pelvis:  No acute inflammatory changes or fluid collections in the pelvis.   Uterus and endometrium appear somewhat heterogeneous, nonspecific and could be  physiologic given patient age unless there is clinical suspicion. Uterus and  ovaries are not enlarged. Urinary bladder is unremarkable grossly. No  lymphadenopathy or mass in the pelvis.     Bones: No acute or suspicious osseous lesions.        IMPRESSION:   1. Pancreatitis with no evidence of associated mass or abscess. 2. Gallstones, borderline common bile duct dilatation. 3. Wall thickening of majority of large bowel is most likely underdistention  artifact (although low-grade colitis could have a similar appearance). 4. Small hiatal hernia. GI details are limited without oral contrast          GI proceeded with ERCP as noted below. 3/23/20 s/p ERCP with CBD stone extraction; Dr Barry Wheeler          Past Medical History:   Diagnosis Date    Anxiety     Depression     Insomnia      History reviewed. No pertinent surgical history. Current Facility-Administered Medications   Medication Dose Route Frequency    sodium chloride (NS) flush 5-40 mL  5-40 mL IntraVENous Q8H    sodium chloride (NS) flush 5-40 mL  5-40 mL IntraVENous PRN    ondansetron (ZOFRAN) injection 4 mg  4 mg IntraVENous Q4H PRN    senna-docusate (PERICOLACE) 8.6-50 mg per tablet 1 Tab  1 Tab Oral DAILY    HYDROmorphone (PF) (DILAUDID) injection 1 mg  1 mg IntraVENous Q4H PRN    LORazepam (ATIVAN) injection 1 mg  1 mg IntraVENous BID PRN    0.9% sodium chloride infusion  200 mL/hr IntraVENous CONTINUOUS    pantoprazole (PROTONIX) tablet 40 mg  40 mg Oral ACB     Patient has no known allergies.   Social History     Socioeconomic History    Marital status:      Spouse name: Not on file    Number of children: Not on file    Years of education: Not on file    Highest education level: Not on file   Tobacco Use    Smoking status: Never Smoker    Smokeless tobacco: Never Used   Substance and Sexual Activity    Alcohol use: No    Drug use: No     Social History     Tobacco Use   Smoking Status Never Smoker   Smokeless Tobacco Never Used History reviewed. No pertinent family history. ROS: The patient has no difficulty with chest pain or shortness of breath. No fever or chills. Comprehensive review of systems was otherwise unremarkable except as noted above. Physical Exam:   Visit Vitals  /73 (BP 1 Location: Left arm, BP Patient Position: Head of bed elevated (Comment degrees); At rest)   Pulse 73   Temp 98.5 °F (36.9 °C)   Resp 17   Ht 5' 5\" (1.651 m)   Wt 170 lb (77.1 kg)   SpO2 98%   BMI 28.29 kg/m²     Vitals:    03/23/20 1521 03/23/20 1557 03/23/20 1958 03/23/20 2346   BP: 122/87 131/90 118/80 115/73   Pulse: 85 84 79 73   Resp:  15 16 17   Temp:  97.7 °F (36.5 °C) 99.5 °F (37.5 °C) 98.5 °F (36.9 °C)   SpO2: 91% 95% 98% 98%   Weight:       Height:         [unfilled]  [unfilled]    Constitutional: Alert, oriented, cooperative patient in no acute distress; appears stated age    Eyes:Sclera are clear. EOMs intact  ENMT: no external lesions gross hearing normal; no obvious neck masses, no ear or lip lesions, nares normal  CV: RRR. Normal perfusion  Resp: No JVD. Breathing is  non-labored; no audible wheezing. GI: soft and non-distended; minimally tender in epigastrum     Musculoskeletal: unremarkable with normal function. No embolic signs or cyanosis.    Neuro:  Oriented; moves all 4; no focal deficits  Psychiatric: normal affect and mood, no memory impairment    Recent vitals (if inpt):  @IPVITALS(24:)@    Labs:  Recent Labs     03/23/20  0923   WBC 13.4*   HGB 13.9         K 3.8      CO2 28   BUN 11   CREA 0.96   *   TBILI 2.9*   SGOT 1,019*   ALT 1,279*   *   LPSE 26,201*       Lab Results   Component Value Date/Time    WBC 13.4 (H) 03/23/2020 09:23 AM    HGB 13.9 03/23/2020 09:23 AM    PLATELET 474 06/80/5176 09:23 AM    Sodium 139 03/23/2020 09:23 AM    Potassium 3.8 03/23/2020 09:23 AM    Chloride 103 03/23/2020 09:23 AM    CO2 28 03/23/2020 09:23 AM    BUN 11 03/23/2020 09:23 AM Creatinine 0.96 03/23/2020 09:23 AM    Glucose 185 (H) 03/23/2020 09:23 AM    INR 1.0 09/27/2013 07:51 AM    aPTT 25.4 09/27/2013 07:51 AM    Bilirubin, total 2.9 (H) 03/23/2020 09:23 AM    AST (SGOT) 1,019 (H) 03/23/2020 09:23 AM    ALT (SGPT) 1,279 (H) 03/23/2020 09:23 AM    Alk. phosphatase 378 (H) 03/23/2020 09:23 AM    Lipase 26,201 (H) 03/23/2020 09:23 AM       CT Results  (Last 48 hours)               03/23/20 1149  CT ABD PELV W CONT Final result    Impression:  IMPRESSION:    1. Pancreatitis with no evidence of associated mass or abscess. 2. Gallstones, borderline common bile duct dilatation. 3. Wall thickening of majority of large bowel is most likely underdistention   artifact (although low-grade colitis could have a similar appearance). 4. Small hiatal hernia. GI details are limited without oral contrast.       Narrative:  CT of the Abdomen and Pelvis with contrast       CLINICAL INDICATION:  Acute vomiting, severe pain right and left lower   quadrants. History includes gallstones. Labs demonstrate leukocytosis,   hyperbilirubinemia, elevated liver function tests. COMPARISON: Ultrasound abdomen performed earlier today       TECHNIQUE: Automated exposure Control was used. Multiple axial images were   obtained through the abdomen and pelvis after intravenous injection of 125cc of   isovue 370. No oral contrast given per request, limiting evaluation of GI tract   and surrounding structures. Coronal reformatted images obtained for further   evaluation of organs. FINDINGS: Partially included lung bases are unremarkable. Abdomen:  No suspicious lesions in the liver or spleen. Again demonstrated are   gallstones, with no evidence of surrounding inflammation. Common bile duct is   borderline dilated measuring up to 7-8 mm diameter. The adrenal glands and   pancreas demonstrate no discrete mass.  There is mild to moderate inflammatory   stranding and trace fluid surrounding the majority of the pancreas, but no   evidence of an associated cystic or solid mass. Tiny round hypodensities in the   renal cortices are too small to characterize, statistically benign incidental   cysts. There is normal enhancement of the kidneys, no hydronephrosis. Normal appendix. Majority of large bowel is decompressed, with mild to moderate   wall thickening noted throughout but no prominent surrounding inflammation. No   lymphadenopathy. No free air, or focal fluid collections in the abdomen. Aorta   normal caliber. Patent major vessels. A small hiatal hernia is noted. There is no evidence of bowel obstruction. GI   evaluation is limited without oral contrast.       Pelvis:  No acute inflammatory changes or fluid collections in the pelvis. Uterus and endometrium appear somewhat heterogeneous, nonspecific and could be   physiologic given patient age unless there is clinical suspicion. Uterus and   ovaries are not enlarged. Urinary bladder is unremarkable grossly. No   lymphadenopathy or mass in the pelvis. Bones: No acute or suspicious osseous lesions. chest X-ray      I reviewed recent labs, recent radiologic studies, and pertinent records including other doctor notes if needed. I independently reviewed radiology images for studies I described above or studies I have ordered.    Admission date (for inpatients): 3/23/2020   [unfilled]  [unfilled]    ASSESSMENT/PLAN:  Problem List  Date Reviewed: 2/6/2020          Codes Class Noted    Hyperbilirubinemia ICD-10-CM: E80.6  ICD-9-CM: 782.4  3/24/2020        Choledocholithiasis ICD-10-CM: K80.50  ICD-9-CM: 574.50  3/24/2020        * (Principal) Acute gallstone pancreatitis ICD-10-CM: K85.10  ICD-9-CM: 034.7, 574.20  3/23/2020        Chronic calculous cholecystitis ICD-10-CM: K80.10  ICD-9-CM: 574.10  2/6/2020        RUQ pain ICD-10-CM: R10.11  ICD-9-CM: 789.01  2/6/2020        Abnormal liver ultrasound ICD-10-CM: R93.2  ICD-9-CM: 793.3  2/6/2020        Elevated liver enzymes ICD-10-CM: R74.8  ICD-9-CM: 790.5  2/6/2020        Rec[facility] ([facility] ICD-10-CM: F33.9  ICD-9-CM: 296.30  1/2/2018        Anxiety ICD-10-CM: F41.9  ICD-9-CM: 300.00  1/2/2018            Principal Problem:    Acute gallstone pancreatitis (3/23/2020)    Active Problems:    Anxiety (1/2/2018)      Chronic calculous cholecystitis (2/6/2020)      Abnormal liver ultrasound (2/6/2020)      Elevated liver enzymes (2/6/2020)      Hyperbilirubinemia (3/24/2020)      Choledocholithiasis (3/24/2020)       She has signs and symptoms suggestive gallstone pancreatitis with elevated LFTs, hyperbilirubinemia, leukocytosis, in addition to abnormal liver on ultrasound worrisome for steatohepatitis. She is s/p ERCP with common duct clearance. She will require interval cholecystectomy once her pancreatitis and peripancreatic edema have resolved. I discussed her condition with her and discussed procedural risks of laparoscopic cholecystectomy and liver biopsy including bleeding, infection, bile leak, injury to bowel or bile duct, persistent symptoms requiring additional studies, hernia, blood clots, risk of anesthesia, etc.. We also discussed risks and benefits of not having surgery and particularly discussed the risk of progression to acute cholecystitis and emergent surgery as well as gangrenous cholecystitis and sepsis should she not have her gallbladder out. All her questions were answered. Timing of surgery uncertain at this point as it will be related to her underlying pancreatitis          I have personally performed a face-to-face diagnostic evaluation and management  service on this patient. I have independently seen the patient. I have independently obtained the above history from the patient/family. I have independently examined the patient with above findings.   I have independently reviewed data/labs for this patient and developed the above plan of care (MDM). Signed: Loida Stuart.  Suzanne Eaton MD, FACS

## 2020-03-24 NOTE — PROGRESS NOTES
Interdisciplinary Rounds completed. Nursing, Case Management, and Physician  present. Plan of care reviewed and updated. GI and surgery following.

## 2020-03-24 NOTE — PROGRESS NOTES
Progress Note    Patient: Stevie Montanez MRN: [id number]  SSN: [id number]    YOB: 1975  Age: 38 y.o. Sex: female      Admit Date: 3/23/2020    LOS: 1 day     Subjective:     Patient with depression and anxiety. Admitted due to abdominal pain. Found to have gallstones, fatty liver and pancreatitis and possible dilated CBD. She had ERCP on 3-. Found to have CBD stone and sphincterotomy was performed and stones were removed. Surgery was consulted and planned for cholecystectomy later. Patient denies pain today. No fever. No nausea. No vomiting. Objective:     Vitals:    03/23/20 1557 03/23/20 1958 03/23/20 2346 03/24/20 0652   BP: 131/90 118/80 115/73 124/80   Pulse: 84 79 73 94   Resp: 15 16 17 18   Temp: 97.7 °F (36.5 °C) 99.5 °F (37.5 °C) 98.5 °F (36.9 °C) 98.4 °F (36.9 °C)   SpO2: 95% 98% 98% 96%   Weight:       Height:            Intake and Output:  Current Shift: 03/24 0701 - 03/24 1900  In: 220 [P.O.:220]  Out: 800 [Urine:800]  Last three shifts: 03/22 1901 - 03/24 0700  In: 1450 [I.V.:1450]  Out: 0     Physical Exam:     General:                    The patient is a pleasant female in no acute distress. Head:                                   Normocephalic/atraumatic. Eyes:                                   No palpebral pallor or scleral icterus. ENT:                                    External auricular and nasal exam within normal limits. Mucous membranes are moist.  Neck:                                   Supple, non-tender, no JVD. Lungs:                       Clear to auscultation bilaterally without wheezes or crackles. No respiratory distress or accessory muscle use. Heart:                                  Regular rate and rhythm, without murmurs, rubs, or gallops. Abdomen:                  Soft, non-tender, mildly distended with normoactive bowel sounds. Genitourinary:           No tenderness over the bladder or bilateral CVAs. Extremities:               Without clubbing, cyanosis, or edema. Skin:                                    Normal color, texture, and turgor. No rashes, lesions, or jaundice. Pulses:                      Radial and dorsalis pedis pulses present 2+ bilaterally. Capillary refill <2s. Neurologic:                CN II-XII grossly intact and symmetrical.                                               Moving all four extremities well with no focal deficits. Psychiatric:                Pleasant demeanor, appropriate affect. Alert and oriented x 3      Lab/Data Review:    Recent Results (from the past 24 hour(s))   METABOLIC PANEL, COMPREHENSIVE    Collection Time: 03/24/20  6:04 AM   Result Value Ref Range    Sodium 141 136 - 145 mmol/L    Potassium 3.4 (L) 3.5 - 5.1 mmol/L    Chloride 108 (H) 98 - 107 mmol/L    CO2 27 21 - 32 mmol/L    Anion gap 6 (L) 7 - 16 mmol/L    Glucose 96 65 - 100 mg/dL    BUN 8 6 - 23 MG/DL    Creatinine 0.78 0.6 - 1.0 MG/DL    GFR est AA >60 >60 ml/min/1.73m2    GFR est non-AA >60 >60 ml/min/1.73m2    Calcium 8.6 8.3 - 10.4 MG/DL    Bilirubin, total 1.2 (H) 0.2 - 1.1 MG/DL    ALT (SGPT) 918 (H) 12 - 65 U/L    AST (SGOT) 495 (H) 15 - 37 U/L    Alk.  phosphatase 335 (H) 50 - 136 U/L    Protein, total 7.1 6.3 - 8.2 g/dL    Albumin 3.6 3.5 - 5.0 g/dL    Globulin 3.5 2.3 - 3.5 g/dL    A-G Ratio 1.0 (L) 1.2 - 3.5     CBC WITH AUTOMATED DIFF    Collection Time: 03/24/20  6:04 AM   Result Value Ref Range    WBC 19.5 (H) 4.3 - 11.1 K/uL    RBC 4.31 4.05 - 5.2 M/uL    HGB 12.5 11.7 - 15.4 g/dL    HCT 39.0 35.8 - 46.3 %    MCV 90.5 79.6 - 97.8 FL    MCH 29.0 26.1 - 32.9 PG    MCHC 32.1 31.4 - 35.0 g/dL    RDW 12.4 11.9 - 14.6 %    PLATELET 861 848 - 983 K/uL    MPV 9.4 9.4 - 12.3 FL    ABSOLUTE NRBC 0.00 0.0 - 0.2 K/uL    DF AUTOMATED      NEUTROPHILS 81 (H) 43 - 78 %    LYMPHOCYTES 12 (L) 13 - 44 %    MONOCYTES 6 4.0 - 12.0 %    EOSINOPHILS 0 (L) 0.5 - 7.8 %    BASOPHILS 0 0.0 - 2.0 %    IMMATURE GRANULOCYTES 1 0.0 - 5.0 %    ABS. NEUTROPHILS 15.8 (H) 1.7 - 8.2 K/UL    ABS. LYMPHOCYTES 2.3 0.5 - 4.6 K/UL    ABS. MONOCYTES 1.2 0.1 - 1.3 K/UL    ABS. EOSINOPHILS 0.0 0.0 - 0.8 K/UL    ABS. BASOPHILS 0.0 0.0 - 0.2 K/UL    ABS. IMM. GRANS. 0.1 0.0 - 0.5 K/UL     CT abdomen and pelvis   3-  IMPRESSION:   1. Pancreatitis with no evidence of associated mass or abscess. 2. Gallstones, borderline common bile duct dilatation. 3. Wall thickening of majority of large bowel is most likely underdistention  artifact (although low-grade colitis could have a similar appearance). 4. Small hiatal hernia.     ERCP   3-  IMPRESSION: Choledocholithiasis.       Current Facility-Administered Medications:     lactated Ringers infusion, 150 mL/hr, IntraVENous, CONTINUOUS, Ananya Ramirez MD, Last Rate: 150 mL/hr at 03/24/20 0824, 150 mL/hr at 03/24/20 0824    [START ON 3/25/2020] ceFAZolin (ANCEF) 2 g/20 mL in sterile water IV syringe, 2 g, IntraVENous, ON CALL TO OR, Ananya Ramirez MD    sodium chloride (NS) flush 5-40 mL, 5-40 mL, IntraVENous, Q8H, Butch Pham DO, 10 mL at 03/24/20 0527    sodium chloride (NS) flush 5-40 mL, 5-40 mL, IntraVENous, PRN, Butch Pham DO    ondansetron (ZOFRAN) injection 4 mg, 4 mg, IntraVENous, Q4H PRN, Butch Pham DO    senna-docusate (PERICOLACE) 8.6-50 mg per tablet 1 Tab, 1 Tab, Oral, DAILY, Butch Pham DO, 1 Tab at 03/24/20 0816    HYDROmorphone (PF) (DILAUDID) injection 1 mg, 1 mg, IntraVENous, Q4H PRN, Ciesco, Butch, DO, 1 mg at 03/24/20 0932    LORazepam (ATIVAN) injection 1 mg, 1 mg, IntraVENous, BID PRN, Ciesco, Butch, DO, 1 mg at 03/23/20 5844    pantoprazole (PROTONIX) tablet 40 mg, 40 mg, Oral, ACB, Ciesco, Butch, DO, 40 mg at 03/24/20 0673      Assessment:     Principal Problem:    Acute gallstone pancreatitis (3/23/2020)    Active Problems:    Anxiety (1/2/2018)      Chronic calculous cholecystitis (2/6/2020)      Abnormal liver ultrasound (2/6/2020)      Elevated liver enzymes (2/6/2020)      Hyperbilirubinemia (3/24/2020)      Choledocholithiasis (3/24/2020)        Plan:     Acute pancreatitis   Gallstones and CBD stones. S/P ERCP and CBD stones removal.   Continue NPO, IV fluid. Symptomatic control. Surgery service is following for possible cholecystectomy later. I have discussed the plan of care with patient.       DVT prophylaxis : SCD     Signed By: Anu Mcintosh MD     March 24, 2020

## 2020-03-24 NOTE — ANESTHESIA PREPROCEDURE EVALUATION
Relevant Problems   No relevant active problems       Anesthetic History   No history of anesthetic complications            Review of Systems / Medical History  Patient summary reviewed and pertinent labs reviewed    Pulmonary  Within defined limits                 Neuro/Psych         Psychiatric history (anxiety/depression)     Cardiovascular                  Exercise tolerance: >4 METS     GI/Hepatic/Renal     GERD: well controlled      Liver disease (fatty liver)    Comments: Chronic pancreatitis     ERCP 3/23 secondary to stones and now for lap katie and liver biopsy secondary to elevated LFTs Endo/Other             Other Findings            Physical Exam    Airway  Mallampati: II  TM Distance: 4 - 6 cm  Neck ROM: normal range of motion   Mouth opening: Diminished (comment)     Cardiovascular  Regular rate and rhythm,  S1 and S2 normal,  no murmur, click, rub, or gallop  Rhythm: regular  Rate: normal         Dental  No notable dental hx       Pulmonary  Breath sounds clear to auscultation               Abdominal         Other Findings            Anesthetic Plan    ASA: 2  Anesthesia type: general            Anesthetic plan and risks discussed with: Patient

## 2020-03-24 NOTE — PROGRESS NOTES
Dr. Bell Reaper request pt output records and fluid orders changed. Pt reports urinating x2 since arrival to floor, large amount each time. Pt reports no urge to urinate. Bladder not palpatable. Pt educated on strict I&O with hat in toilet for measuring. Will start LR bolus and subsequent 150 mL/hr and monitor pt output. Pt given clear liquids as ordered. Pt agitated about diet and not having surgery. Pt being verbally abusive.

## 2020-03-25 ENCOUNTER — ANESTHESIA (OUTPATIENT)
Dept: SURGERY | Age: 45
DRG: 418 | End: 2020-03-25
Payer: COMMERCIAL

## 2020-03-25 PROBLEM — E87.6 HYPOKALEMIA: Status: ACTIVE | Noted: 2020-03-25

## 2020-03-25 PROBLEM — K85.90 ACUTE PANCREATITIS: Status: ACTIVE | Noted: 2020-03-25

## 2020-03-25 LAB
ALBUMIN SERPL-MCNC: 2.5 G/DL (ref 3.5–5)
ALBUMIN/GLOB SERPL: 0.7 {RATIO} (ref 1.2–3.5)
ALP SERPL-CCNC: 221 U/L (ref 50–136)
ALT SERPL-CCNC: 565 U/L (ref 12–65)
ANION GAP SERPL CALC-SCNC: 1 MMOL/L (ref 7–16)
AST SERPL-CCNC: 217 U/L (ref 15–37)
BASOPHILS # BLD: 0.1 K/UL (ref 0–0.2)
BASOPHILS NFR BLD: 0 % (ref 0–2)
BILIRUB SERPL-MCNC: 1.2 MG/DL (ref 0.2–1.1)
BUN SERPL-MCNC: 3 MG/DL (ref 6–23)
CALCIUM SERPL-MCNC: 8.2 MG/DL (ref 8.3–10.4)
CHLORIDE SERPL-SCNC: 106 MMOL/L (ref 98–107)
CO2 SERPL-SCNC: 34 MMOL/L (ref 21–32)
CREAT SERPL-MCNC: 0.57 MG/DL (ref 0.6–1)
DIFFERENTIAL METHOD BLD: ABNORMAL
EOSINOPHIL # BLD: 0.4 K/UL (ref 0–0.8)
EOSINOPHIL NFR BLD: 3 % (ref 0.5–7.8)
ERYTHROCYTE [DISTWIDTH] IN BLOOD BY AUTOMATED COUNT: 12.4 % (ref 11.9–14.6)
GLOBULIN SER CALC-MCNC: 3.4 G/DL (ref 2.3–3.5)
GLUCOSE SERPL-MCNC: 97 MG/DL (ref 65–100)
HCT VFR BLD AUTO: 34.8 % (ref 35.8–46.3)
HGB BLD-MCNC: 11 G/DL (ref 11.7–15.4)
IMM GRANULOCYTES # BLD AUTO: 0 K/UL (ref 0–0.5)
IMM GRANULOCYTES NFR BLD AUTO: 0 % (ref 0–5)
LYMPHOCYTES # BLD: 2.4 K/UL (ref 0.5–4.6)
LYMPHOCYTES NFR BLD: 19 % (ref 13–44)
MCH RBC QN AUTO: 28.6 PG (ref 26.1–32.9)
MCHC RBC AUTO-ENTMCNC: 31.6 G/DL (ref 31.4–35)
MCV RBC AUTO: 90.4 FL (ref 79.6–97.8)
MONOCYTES # BLD: 1.3 K/UL (ref 0.1–1.3)
MONOCYTES NFR BLD: 10 % (ref 4–12)
NEUTS SEG # BLD: 8.7 K/UL (ref 1.7–8.2)
NEUTS SEG NFR BLD: 68 % (ref 43–78)
NRBC # BLD: 0 K/UL (ref 0–0.2)
PLATELET # BLD AUTO: 292 K/UL (ref 150–450)
PMV BLD AUTO: 9.3 FL (ref 9.4–12.3)
POTASSIUM SERPL-SCNC: 3 MMOL/L (ref 3.5–5.1)
PROT SERPL-MCNC: 5.9 G/DL (ref 6.3–8.2)
RBC # BLD AUTO: 3.85 M/UL (ref 4.05–5.2)
SODIUM SERPL-SCNC: 141 MMOL/L (ref 136–145)
WBC # BLD AUTO: 12.9 K/UL (ref 4.3–11.1)

## 2020-03-25 PROCEDURE — 77030021158 HC TRCR BLN GELPRT AMR -B: Performed by: SURGERY

## 2020-03-25 PROCEDURE — 74011250636 HC RX REV CODE- 250/636: Performed by: INTERNAL MEDICINE

## 2020-03-25 PROCEDURE — 74011250637 HC RX REV CODE- 250/637: Performed by: SURGERY

## 2020-03-25 PROCEDURE — 77030018875 HC APPL CLP LIG4 J&J -B: Performed by: SURGERY

## 2020-03-25 PROCEDURE — 77030039425 HC BLD LARYNG TRULITE DISP TELE -A: Performed by: ANESTHESIOLOGY

## 2020-03-25 PROCEDURE — 76210000006 HC OR PH I REC 0.5 TO 1 HR: Performed by: SURGERY

## 2020-03-25 PROCEDURE — 74011250637 HC RX REV CODE- 250/637: Performed by: ANESTHESIOLOGY

## 2020-03-25 PROCEDURE — 77030035044 HC TRCR ENDOSC VRSPRT BLDLSS COVD -C: Performed by: SURGERY

## 2020-03-25 PROCEDURE — 74011000250 HC RX REV CODE- 250: Performed by: NURSE ANESTHETIST, CERTIFIED REGISTERED

## 2020-03-25 PROCEDURE — 76010000171 HC OR TIME 2 TO 2.5 HR INTENSV-TIER 1: Performed by: SURGERY

## 2020-03-25 PROCEDURE — 74011250636 HC RX REV CODE- 250/636: Performed by: ANESTHESIOLOGY

## 2020-03-25 PROCEDURE — 77030008522 HC TBNG INSUF LAPRO STRY -B: Performed by: SURGERY

## 2020-03-25 PROCEDURE — 77030040361 HC SLV COMPR DVT MDII -B: Performed by: SURGERY

## 2020-03-25 PROCEDURE — 77030031139 HC SUT VCRL2 J&J -A: Performed by: SURGERY

## 2020-03-25 PROCEDURE — 77030018876 HC APPL CLP LIG4 COVD -B: Performed by: SURGERY

## 2020-03-25 PROCEDURE — 77030008462 HC STPLR SKN PROX J&J -A: Performed by: SURGERY

## 2020-03-25 PROCEDURE — 88312 SPECIAL STAINS GROUP 1: CPT

## 2020-03-25 PROCEDURE — 74011250636 HC RX REV CODE- 250/636: Performed by: NURSE ANESTHETIST, CERTIFIED REGISTERED

## 2020-03-25 PROCEDURE — 77030007955 HC PCH ENDOSC SPEC J&J -B: Performed by: SURGERY

## 2020-03-25 PROCEDURE — 88304 TISSUE EXAM BY PATHOLOGIST: CPT

## 2020-03-25 PROCEDURE — 77030014008 HC SPNG HEMSTAT J&J -C: Performed by: SURGERY

## 2020-03-25 PROCEDURE — 77030019908 HC STETH ESOPH SIMS -A: Performed by: ANESTHESIOLOGY

## 2020-03-25 PROCEDURE — 77030037088 HC TUBE ENDOTRACH ORAL NSL COVD-A: Performed by: ANESTHESIOLOGY

## 2020-03-25 PROCEDURE — 85025 COMPLETE CBC W/AUTO DIFF WBC: CPT

## 2020-03-25 PROCEDURE — 74011250636 HC RX REV CODE- 250/636: Performed by: SURGERY

## 2020-03-25 PROCEDURE — 77030008756 HC TU IRR SUC STRY -B: Performed by: SURGERY

## 2020-03-25 PROCEDURE — 77030020829: Performed by: SURGERY

## 2020-03-25 PROCEDURE — 88313 SPECIAL STAINS GROUP 2: CPT

## 2020-03-25 PROCEDURE — 36415 COLL VENOUS BLD VENIPUNCTURE: CPT

## 2020-03-25 PROCEDURE — 77030002996 HC SUT SLK J&J -A: Performed by: SURGERY

## 2020-03-25 PROCEDURE — 0FB04ZX EXCISION OF LIVER, PERCUTANEOUS ENDOSCOPIC APPROACH, DIAGNOSTIC: ICD-10-PCS | Performed by: SURGERY

## 2020-03-25 PROCEDURE — 77030040922 HC BLNKT HYPOTHRM STRY -A: Performed by: ANESTHESIOLOGY

## 2020-03-25 PROCEDURE — 80053 COMPREHEN METABOLIC PANEL: CPT

## 2020-03-25 PROCEDURE — 76060000035 HC ANESTHESIA 2 TO 2.5 HR: Performed by: SURGERY

## 2020-03-25 PROCEDURE — 77030040506 HC DRN WND MDII -A: Performed by: SURGERY

## 2020-03-25 PROCEDURE — 77030008606 HC TRCR ENDOSC KII AMR -B: Performed by: SURGERY

## 2020-03-25 PROCEDURE — 88307 TISSUE EXAM BY PATHOLOGIST: CPT

## 2020-03-25 PROCEDURE — 74011250637 HC RX REV CODE- 250/637: Performed by: INTERNAL MEDICINE

## 2020-03-25 PROCEDURE — 77030000038 HC TIP SCIS LAPSCP SURI -B: Performed by: SURGERY

## 2020-03-25 PROCEDURE — 0FT44ZZ RESECTION OF GALLBLADDER, PERCUTANEOUS ENDOSCOPIC APPROACH: ICD-10-PCS | Performed by: SURGERY

## 2020-03-25 PROCEDURE — 74011000258 HC RX REV CODE- 258: Performed by: SURGERY

## 2020-03-25 PROCEDURE — 77030009851 HC PCH RTVR ENDOSC AMR -B: Performed by: SURGERY

## 2020-03-25 PROCEDURE — 65270000029 HC RM PRIVATE

## 2020-03-25 PROCEDURE — 74011000250 HC RX REV CODE- 250: Performed by: SURGERY

## 2020-03-25 RX ORDER — SODIUM CHLORIDE, SODIUM LACTATE, POTASSIUM CHLORIDE, CALCIUM CHLORIDE 600; 310; 30; 20 MG/100ML; MG/100ML; MG/100ML; MG/100ML
75 INJECTION, SOLUTION INTRAVENOUS CONTINUOUS
Status: DISCONTINUED | OUTPATIENT
Start: 2020-03-25 | End: 2020-03-25 | Stop reason: HOSPADM

## 2020-03-25 RX ORDER — LIDOCAINE HYDROCHLORIDE 20 MG/ML
INJECTION, SOLUTION EPIDURAL; INFILTRATION; INTRACAUDAL; PERINEURAL AS NEEDED
Status: DISCONTINUED | OUTPATIENT
Start: 2020-03-25 | End: 2020-03-25 | Stop reason: HOSPADM

## 2020-03-25 RX ORDER — FENTANYL CITRATE 50 UG/ML
100 INJECTION, SOLUTION INTRAMUSCULAR; INTRAVENOUS ONCE
Status: DISCONTINUED | OUTPATIENT
Start: 2020-03-25 | End: 2020-03-25 | Stop reason: HOSPADM

## 2020-03-25 RX ORDER — GLYCOPYRROLATE 0.2 MG/ML
INJECTION INTRAMUSCULAR; INTRAVENOUS AS NEEDED
Status: DISCONTINUED | OUTPATIENT
Start: 2020-03-25 | End: 2020-03-25 | Stop reason: HOSPADM

## 2020-03-25 RX ORDER — DEXTROSE, SODIUM CHLORIDE, AND POTASSIUM CHLORIDE 5; .45; .15 G/100ML; G/100ML; G/100ML
100 INJECTION INTRAVENOUS CONTINUOUS
Status: DISCONTINUED | OUTPATIENT
Start: 2020-03-25 | End: 2020-03-27 | Stop reason: HOSPADM

## 2020-03-25 RX ORDER — KETOROLAC TROMETHAMINE 30 MG/ML
INJECTION, SOLUTION INTRAMUSCULAR; INTRAVENOUS AS NEEDED
Status: DISCONTINUED | OUTPATIENT
Start: 2020-03-25 | End: 2020-03-25 | Stop reason: HOSPADM

## 2020-03-25 RX ORDER — HYDROCODONE BITARTRATE AND ACETAMINOPHEN 5; 325 MG/1; MG/1
2 TABLET ORAL AS NEEDED
Status: DISCONTINUED | OUTPATIENT
Start: 2020-03-25 | End: 2020-03-25 | Stop reason: HOSPADM

## 2020-03-25 RX ORDER — HYDROMORPHONE HYDROCHLORIDE 2 MG/ML
0.5 INJECTION, SOLUTION INTRAMUSCULAR; INTRAVENOUS; SUBCUTANEOUS
Status: DISCONTINUED | OUTPATIENT
Start: 2020-03-25 | End: 2020-03-25 | Stop reason: HOSPADM

## 2020-03-25 RX ORDER — PROMETHAZINE HYDROCHLORIDE 25 MG/ML
INJECTION, SOLUTION INTRAMUSCULAR; INTRAVENOUS AS NEEDED
Status: DISCONTINUED | OUTPATIENT
Start: 2020-03-25 | End: 2020-03-25 | Stop reason: HOSPADM

## 2020-03-25 RX ORDER — ACETAMINOPHEN 500 MG
1000 TABLET ORAL
Status: DISCONTINUED | OUTPATIENT
Start: 2020-03-25 | End: 2020-03-27 | Stop reason: HOSPADM

## 2020-03-25 RX ORDER — PROPOFOL 10 MG/ML
INJECTION, EMULSION INTRAVENOUS AS NEEDED
Status: DISCONTINUED | OUTPATIENT
Start: 2020-03-25 | End: 2020-03-25 | Stop reason: HOSPADM

## 2020-03-25 RX ORDER — FENTANYL CITRATE 50 UG/ML
INJECTION, SOLUTION INTRAMUSCULAR; INTRAVENOUS AS NEEDED
Status: DISCONTINUED | OUTPATIENT
Start: 2020-03-25 | End: 2020-03-25 | Stop reason: HOSPADM

## 2020-03-25 RX ORDER — MORPHINE SULFATE 10 MG/ML
2-6 INJECTION, SOLUTION INTRAMUSCULAR; INTRAVENOUS
Status: DISCONTINUED | OUTPATIENT
Start: 2020-03-25 | End: 2020-03-27 | Stop reason: HOSPADM

## 2020-03-25 RX ORDER — NEOSTIGMINE METHYLSULFATE 1 MG/ML
INJECTION, SOLUTION INTRAVENOUS AS NEEDED
Status: DISCONTINUED | OUTPATIENT
Start: 2020-03-25 | End: 2020-03-25 | Stop reason: HOSPADM

## 2020-03-25 RX ORDER — LIDOCAINE HYDROCHLORIDE 10 MG/ML
0.1 INJECTION INFILTRATION; PERINEURAL AS NEEDED
Status: DISCONTINUED | OUTPATIENT
Start: 2020-03-25 | End: 2020-03-25 | Stop reason: HOSPADM

## 2020-03-25 RX ORDER — ONDANSETRON 2 MG/ML
INJECTION INTRAMUSCULAR; INTRAVENOUS AS NEEDED
Status: DISCONTINUED | OUTPATIENT
Start: 2020-03-25 | End: 2020-03-25 | Stop reason: HOSPADM

## 2020-03-25 RX ORDER — OXYCODONE HYDROCHLORIDE 5 MG/1
5 TABLET ORAL
Status: COMPLETED | OUTPATIENT
Start: 2020-03-25 | End: 2020-03-25

## 2020-03-25 RX ORDER — OXYCODONE HYDROCHLORIDE 5 MG/1
5-10 TABLET ORAL
Status: DISCONTINUED | OUTPATIENT
Start: 2020-03-25 | End: 2020-03-27 | Stop reason: HOSPADM

## 2020-03-25 RX ORDER — MIDAZOLAM HYDROCHLORIDE 1 MG/ML
5 INJECTION, SOLUTION INTRAMUSCULAR; INTRAVENOUS ONCE
Status: DISCONTINUED | OUTPATIENT
Start: 2020-03-25 | End: 2020-03-25 | Stop reason: HOSPADM

## 2020-03-25 RX ORDER — BUPIVACAINE HYDROCHLORIDE 2.5 MG/ML
INJECTION, SOLUTION EPIDURAL; INFILTRATION; INTRACAUDAL AS NEEDED
Status: DISCONTINUED | OUTPATIENT
Start: 2020-03-25 | End: 2020-03-25 | Stop reason: HOSPADM

## 2020-03-25 RX ORDER — POTASSIUM CHLORIDE 14.9 MG/ML
20 INJECTION INTRAVENOUS
Status: DISCONTINUED | OUTPATIENT
Start: 2020-03-25 | End: 2020-03-25

## 2020-03-25 RX ORDER — DEXAMETHASONE SODIUM PHOSPHATE 4 MG/ML
INJECTION, SOLUTION INTRA-ARTICULAR; INTRALESIONAL; INTRAMUSCULAR; INTRAVENOUS; SOFT TISSUE AS NEEDED
Status: DISCONTINUED | OUTPATIENT
Start: 2020-03-25 | End: 2020-03-25 | Stop reason: HOSPADM

## 2020-03-25 RX ORDER — MIDAZOLAM HYDROCHLORIDE 1 MG/ML
2 INJECTION, SOLUTION INTRAMUSCULAR; INTRAVENOUS
Status: COMPLETED | OUTPATIENT
Start: 2020-03-25 | End: 2020-03-25

## 2020-03-25 RX ORDER — ROCURONIUM BROMIDE 10 MG/ML
INJECTION, SOLUTION INTRAVENOUS AS NEEDED
Status: DISCONTINUED | OUTPATIENT
Start: 2020-03-25 | End: 2020-03-25 | Stop reason: HOSPADM

## 2020-03-25 RX ADMIN — PROPOFOL 200 MG: 10 INJECTION, EMULSION INTRAVENOUS at 13:00

## 2020-03-25 RX ADMIN — FENTANYL CITRATE 50 MCG: 50 INJECTION INTRAMUSCULAR; INTRAVENOUS at 13:00

## 2020-03-25 RX ADMIN — Medication 10 ML: at 14:00

## 2020-03-25 RX ADMIN — SODIUM CHLORIDE, SODIUM LACTATE, POTASSIUM CHLORIDE, AND CALCIUM CHLORIDE 75 ML/HR: 600; 310; 30; 20 INJECTION, SOLUTION INTRAVENOUS at 10:49

## 2020-03-25 RX ADMIN — DEXAMETHASONE SODIUM PHOSPHATE 10 MG: 4 INJECTION, SOLUTION INTRAMUSCULAR; INTRAVENOUS at 13:15

## 2020-03-25 RX ADMIN — MIDAZOLAM 2 MG: 1 INJECTION INTRAMUSCULAR; INTRAVENOUS at 11:38

## 2020-03-25 RX ADMIN — FENTANYL CITRATE 50 MCG: 50 INJECTION INTRAMUSCULAR; INTRAVENOUS at 13:38

## 2020-03-25 RX ADMIN — ONDANSETRON 4 MG: 2 INJECTION INTRAMUSCULAR; INTRAVENOUS at 13:15

## 2020-03-25 RX ADMIN — HYDROMORPHONE HYDROCHLORIDE 1 MG: 1 INJECTION, SOLUTION INTRAMUSCULAR; INTRAVENOUS; SUBCUTANEOUS at 01:48

## 2020-03-25 RX ADMIN — DEXTROSE MONOHYDRATE, SODIUM CHLORIDE, AND POTASSIUM CHLORIDE 100 ML/HR: 50; 4.5; 1.49 INJECTION, SOLUTION INTRAVENOUS at 08:27

## 2020-03-25 RX ADMIN — FENTANYL CITRATE 50 MCG: 50 INJECTION INTRAMUSCULAR; INTRAVENOUS at 14:40

## 2020-03-25 RX ADMIN — LIDOCAINE HYDROCHLORIDE 100 MG: 20 INJECTION, SOLUTION EPIDURAL; INFILTRATION; INTRACAUDAL; PERINEURAL at 13:00

## 2020-03-25 RX ADMIN — POTASSIUM CHLORIDE 20 MEQ: 200 INJECTION, SOLUTION INTRAVENOUS at 10:01

## 2020-03-25 RX ADMIN — Medication 2 G: at 13:09

## 2020-03-25 RX ADMIN — FENTANYL CITRATE 50 MCG: 50 INJECTION INTRAMUSCULAR; INTRAVENOUS at 13:15

## 2020-03-25 RX ADMIN — SENNOSIDES AND DOCUSATE SODIUM 1 TABLET: 8.6; 5 TABLET ORAL at 08:26

## 2020-03-25 RX ADMIN — ROCURONIUM BROMIDE 5 MG: 10 INJECTION, SOLUTION INTRAVENOUS at 14:22

## 2020-03-25 RX ADMIN — GLYCOPYRROLATE 0.6 MG: 0.2 INJECTION, SOLUTION INTRAMUSCULAR; INTRAVENOUS at 15:07

## 2020-03-25 RX ADMIN — FENTANYL CITRATE 50 MCG: 50 INJECTION INTRAMUSCULAR; INTRAVENOUS at 13:22

## 2020-03-25 RX ADMIN — ROCURONIUM BROMIDE 5 MG: 10 INJECTION, SOLUTION INTRAVENOUS at 14:39

## 2020-03-25 RX ADMIN — Medication 4 MG: at 15:07

## 2020-03-25 RX ADMIN — PIPERACILLIN AND TAZOBACTAM 4.5 G: 4; .5 INJECTION, POWDER, FOR SOLUTION INTRAVENOUS at 18:03

## 2020-03-25 RX ADMIN — Medication 10 ML: at 06:41

## 2020-03-25 RX ADMIN — OXYCODONE HYDROCHLORIDE 5 MG: 5 TABLET ORAL at 18:43

## 2020-03-25 RX ADMIN — KETOROLAC TROMETHAMINE 30 MG: 30 INJECTION, SOLUTION INTRAMUSCULAR; INTRAVENOUS at 15:00

## 2020-03-25 RX ADMIN — ROCURONIUM BROMIDE 40 MG: 10 INJECTION, SOLUTION INTRAVENOUS at 13:00

## 2020-03-25 RX ADMIN — PROMETHAZINE HYDROCHLORIDE 6.25 MG: 25 INJECTION INTRAMUSCULAR; INTRAVENOUS at 13:52

## 2020-03-25 RX ADMIN — OXYCODONE HYDROCHLORIDE 5 MG: 5 TABLET ORAL at 15:43

## 2020-03-25 RX ADMIN — SODIUM CHLORIDE, SODIUM LACTATE, POTASSIUM CHLORIDE, AND CALCIUM CHLORIDE: 600; 310; 30; 20 INJECTION, SOLUTION INTRAVENOUS at 13:59

## 2020-03-25 RX ADMIN — FENTANYL CITRATE 50 MCG: 50 INJECTION INTRAMUSCULAR; INTRAVENOUS at 15:07

## 2020-03-25 NOTE — OP NOTES
[address] [facility]  [phone]    [facility] REPORT    Name: Fabi Gloria     Date of Surgery: 3/25/2020  Med Record Number: [id number]   Age: 38 y.o. Sex: female   Pre-operative Diagnosis:   Acute Gallstone pancreatitis  Lipase >26,000  Biliary Obstruction  S/p ERCP for CBD stone extraction  Elevate LFTs  Abnormal liver US at [address] Cholecystitis    Post-operative Diagnosis: same    Procedure:   Peripancreatic drain placement for acute pancreatitis (CPT 81945)  Laparoscopic Cholecystectomy (CPT 11152-27)  Laparoscopic wedge liver biopsy (CPT 01072-83)    Surgeon: Celeste Gasca MD  Asistants: none  Anesthesia:  General  Complications: none  Specimen: gallbladder to path; wedge liver biopsy to path  Estimated Blood Loss: <30cc    Procedure Description:   The risks, benefits, potential complications, treatment options, and expected outcomes were discussed with the patient pre-operatively. The possibilities of reaction to medication, chronic pain, bleeding, infection, injury to internal organs including bowel or  bile ducts, blood clots, hernia, bile leak, the need for further surgery, open surgery, etc.. .. Lawanda Moon were discussed with the patient. The patient voiced understanding and gave informed consent preoperatively. The patient was taken to the Operating Room, and the [facility] time-out protocol checklist was followed. After the induction of adequate anesthesia, the abdomen was prepped and draped in the usual sterile fashion. Preoperative antibiotics were given. A sub umbilical incision was made and a cut down approach was used to place a blunt Joyce trochar under direct vision. After adequate pneumoperitioneum, two 5mm static and dynamic retraction ports were placed and an 11mm trochar also placed, all in the usual locations. The gallbladder was markedly inflamed and ischemic.   There was pericholecystic fluid and peripancreatic fluid related to the pancreatitis. Was suctioned during the case. A lot of edema present. There is a lot of up bowel distention. Gallbladder was difficult to dissect. The planes of dissection were difficult to establish. The gallbladder tore in several places where there was ischemic damage. Photographs were taken during the case. Dissection was incredibly difficult and tedious. Surgery took a substantial amount of time. There was a lot of inflammatory bleeding. The gallbladder was retracted and inflammatory adhesions to the inferior aspect of the gallbladder were taken down. Careful and tedious dissection was performed to free up the cystic duct and artery from the surrounding tissues. A clear window was created between the inferior aspect of the gallbladder wall, the cystic duct, and the cystic artery. The cystic duct could clearly be seen to enter the gallbladder and the cystic artery seen to traverse on the gallbladder wall toward the fundus of the gallbladder. The critical view of safety was achieved. We placed 3 clips on the distal cystic duct (patient side) and 2 clips on the proximal (specimen side) of the cystic duct. Cystic duct was quite dilated. The [employer] 10 mm clip applier barely reached across this duct. Clipped 3 times on the patient's side and once on the specimen side and divided the cystic duct. The cystic artery was divided proximally distally with clips as well and then divided. The cystic artery could be seen to pulsate at its clipped end as usual.  The gallbladder was the removed from its attachments to the liver. Small venous tributaries were clipped as needed as dissection was carried up toward the gallbladder fundus. The gallbladder was retracted out through the umbilical trochar suite with the camera placed temporarily through the epigastric trochar site. After the gallbladder had been removed, we turned our attention to the task the liver biopsy.   The liver looked only mildly abnormal and had signs of hepatic steatosis. A liver wedge biopsy was obtained from the lateral aspect of segment IV at the apex of the fundal/dome region of the gallbladder fossa. This was performed with cautery around the border of the biopsy site and then curved scissors to wedge out an adequate specimen for histologic review. The gallbladder fossa and liver biopsy site were inspected. No bile leaks were seen, the clips on the artery and duct were intact and hemostatsis confirmed. Given that he has severe inflammatory changes present as well as the pancreatic ascites, I left the subhepatic and davian-pancreatic drain at the end of the case which was a 19 round Jose Angel drain. This was brought out through the lateral most 5 mm trocar site and used to evacuate peripancreatic ascites related to the acute pancreatitis as well as the gallbladder fossa. To the peripancreatic drain was secured at its exit site to the skin with silk suture, we infiltrated Marcaine into the pre peritoneal tissues around each trochar site. The fascia of the umbilical incision was closed with interrupted 0 vicryl suture. Clips were placed to close the skin incisions. The wounds were dressed sterilely with telfa and tegaderm, All sponge, instruments, and needle counts were correct. The patient was taken to recovery in good condition.     Jordy Lopez MD, FACS      ,

## 2020-03-25 NOTE — PROGRESS NOTES
[facility] Progress Note         Admit Date:  3/23/2020    Today's Date:  3/25/2020    CC: Abd pain    Subjective:     Patient is feeling a little better, but has had some continued bloating and fullness over her upper abdomen. She denies any nausea or vomiting. She has not had a BM. She denies any bleeding. Medications:   Current Facility-Administered Medications   Medication Dose Route Frequency    dextrose 5% - 0.45% NaCl with KCl 20 mEq/L infusion  100 mL/hr IntraVENous CONTINUOUS    ceFAZolin (ANCEF) 2 g/20 mL in sterile water IV syringe  2 g IntraVENous ON CALL TO OR    ibuprofen (MOTRIN) tablet 400 mg  400 mg Oral Q6H PRN    sodium chloride (NS) flush 5-40 mL  5-40 mL IntraVENous Q8H    sodium chloride (NS) flush 5-40 mL  5-40 mL IntraVENous PRN    ondansetron (ZOFRAN) injection 4 mg  4 mg IntraVENous Q4H PRN    senna-docusate (PERICOLACE) 8.6-50 mg per tablet 1 Tab  1 Tab Oral DAILY    HYDROmorphone (PF) (DILAUDID) injection 1 mg  1 mg IntraVENous Q4H PRN    LORazepam (ATIVAN) injection 1 mg  1 mg IntraVENous BID PRN    pantoprazole (PROTONIX) tablet 40 mg  40 mg Oral ACB       Review of Systems:  ROS was obtained, with pertinent positives as listed above. No chest pain or SOB. Diet:  NPO    Objective:   Vitals:  Visit Vitals  BP 99/67 (BP 1 Location: Left arm, BP Patient Position: At rest)   Pulse 83   Temp 99.8 °F (37.7 °C)   Resp 18   Ht 5' 5\" (1.651 m)   Wt 78.2 kg (172 lb 8 oz)   SpO2 96%   BMI 28.71 kg/m²     Intake/Output:  No intake/output data recorded. 03/23 1901 - 03/25 0700  In: 3602.8 [P.O.:220; I.V.:3382.8]  Out: 3000 [Urine:3000]  Exam:  General appearance: alert, cooperative, no distress, lying in bed  Lungs: clear to auscultation bilaterally anteriorly  Heart: regular rate and rhythm  Abdomen: soft, non-tender.  Bowel sounds normal. No masses, no organomegaly  Neuro:  alert and oriented    Data Review (Labs):    Recent Labs     03/25/20  0501 03/24/20  0604 03/23/20  0923   WBC 12.9* 19.5* 13.4*   HGB 11.0* 12.5 13.9   HCT 34.8* 39.0 42.5    365 424   MCV 90.4 90.5 88.4    141 139   K 3.0* 3.4* 3.8    108* 103   CO2 34* 27 28   BUN 3* 8 11   CREA 0.57* 0.78 0.96   CA 8.2* 8.6 9.4   MG  --   --  2.2   GLU 97 96 185*   * 335* 378*   SGOT 217* 495* 1,019*   * 918* 1,279*   TBILI 1.2* 1.2* 2.9*   ALB 2.5* 3.6 4.3   TP 5.9* 7.1 8.4*   LPSE  --   --  26,201*     CT of the Abdomen and Pelvis with contrast 23 March 2020  CLINICAL INDICATION:  Acute vomiting, severe pain right and left lower  quadrants. History includes gallstones. Labs demonstrate leukocytosis,  hyperbilirubinemia, elevated liver function tests.   COMPARISON: Ultrasound abdomen performed earlier today   TECHNIQUE: Automated exposure Control was used. Multiple axial images were  obtained through the abdomen and pelvis after intravenous injection of 125cc of  isovue 370. No oral contrast given per request, limiting evaluation of GI tract  and surrounding structures. Coronal reformatted images obtained for further  evaluation of organs.   FINDINGS: Partially included lung bases are unremarkable.   Abdomen:  No suspicious lesions in the liver or spleen. Again demonstrated are  gallstones, with no evidence of surrounding inflammation. Common bile duct is  borderline dilated measuring up to 7-8 mm diameter. The adrenal glands and  pancreas demonstrate no discrete mass. There is mild to moderate inflammatory  stranding and trace fluid surrounding the majority of the pancreas, but no  evidence of an associated cystic or solid mass.  Tiny round hypodensities in the  renal cortices are too small to characterize, statistically benign incidental  cysts. There is normal enhancement of the kidneys, no hydronephrosis.     Normal appendix. Majority of large bowel is decompressed, with mild to moderate  wall thickening noted throughout but no prominent surrounding inflammation. No  lymphadenopathy. No free air, or focal fluid collections in the abdomen. Aorta  normal caliber.  Patent major vessels.   A small hiatal hernia is noted. There is no evidence of bowel obstruction. GI  evaluation is limited without oral contrast.   Pelvis:  No acute inflammatory changes or fluid collections in the pelvis. Uterus and endometrium appear somewhat heterogeneous, nonspecific and could be  physiologic given patient age unless there is clinical suspicion. Uterus and  ovaries are not enlarged. Urinary bladder is unremarkable grossly. No  lymphadenopathy or mass in the pelvis.   Bones: No acute or suspicious osseous lesions.   IMPRESSION  IMPRESSION:   1. Pancreatitis with no evidence of associated mass or abscess. 2. Gallstones, borderline common bile duct dilatation. 3. Wall thickening of majority of large bowel is most likely underdistention  artifact (although low-grade colitis could have a similar appearance). 4. Small hiatal hernia.   GI details are limited without oral contrast.     Right Upper Quadrant Ultrasound 23 March 2020  INDICATION:  Acute moderate pain right upper abdomen and both lower quadrants;  with elevated lipase and elevated liver function tests, leukocytosis,  hyperbilirubinemia. COMPARISON: none  TECHNIQUE:  Sonographic gray scale and Doppler images were obtained of the right  upper quadrant of the abdomen. FINDINGS: There are no discrete lesions in the visualized portions of the liver. Liver size is 14.0 cm, within normal limits. Main portal vein is patent on Doppler imaging. There is no visualized choledocholithiasis or bile duct dilatation. The common  bile duct diameter is 3 mm. Gallbladder demonstrates multiple intraluminal  layering stones, and borderline 3 mm wall thickening but no surrounding fluid. Sonographic Leblanc's sign is not positive during the exam.  There are no discrete lesions in the limited visualized portions of the  pancreas.    The right kidney measures 9.8 cm in length. There is no hydronephrosis. Color  Doppler demonstrates expected right renal flow. There is no evidence of a solid  renal mass. There is no evidence of ascites. The right and left lower quadrant appear  grossly unremarkable as seen by ultrasound. The aorta and IVC are patent on Doppler imaging.  Aortic diameter is within  normal limits. IMPRESSION  IMPRESSION:   1. Gallstones. 2. No biliary dilatation.     ERCP 23 March 2020 with Dr. Graciela Alves with Impression: Choledocholithiasis, sphincterotomy and stones removed. Plan:  1. Treat for pancreatitis  2. CCY  3. IVF  4. Pain control    Assessment:     Principal Problem:    Acute gallstone pancreatitis (3/23/2020)    Active Problems:    Anxiety (1/2/2018)      Chronic calculous cholecystitis (2/6/2020)      Abnormal liver ultrasound (2/6/2020)      Elevated liver enzymes (2/6/2020)      Hyperbilirubinemia (3/24/2020)      Choledocholithiasis (3/24/2020)      Hypokalemia (3/25/2020)    39 yo female with PMH of anxiety, depression, insomnia, who was seen in consultation 23 March 2020 for abd pain, with jaundice and concern for biliary pancreatitis. She underwent ERCP on 23 March 2020 with Dr. Graciela Alves with choledocholithiasis noted, s/p sphincterotomy and stones removed. She has been seen by surgery and cholecystectomy pending. She is feeling better today and LFTs are improving. Leukocytosis improving. Plan:     -Supportive care. -NPO for surgery. Cholecystectomy pending today. Liver bx at time of surgery.  -On ATBXs.  -Monitor for any increase in symptoms.  -Follow up in our office in 3-4 weeks. Our office will contact pt to make the appt. Patient is seen and examined in collaboration with Dr. Soco Alicea.  Assessment and plan as per Dr. Pavithra Browne.  Ivanna Ahn  [facility]

## 2020-03-25 NOTE — ROUTINE PROCESS
TRANSFER - IN REPORT: 
 
Verbal report received from Nery Gan RN (name) on [facility]  being received from 6th floor(unit) for ordered procedure Report consisted of patients Situation, Background, Assessment and  
Recommendations(SBAR). Information from the following report(s) Kardex, Intake/Output, MAR, Recent Results, Med Rec Status, Pre Procedure Checklist, Procedure Verification and Quality Measures was reviewed with the receiving nurse. Opportunity for questions and clarification was provided. Assessment completed upon patients arrival to unit and care assumed.

## 2020-03-25 NOTE — PROGRESS NOTES
H&P/Consult Note/Progress Note/Office Note:   Renea Glass  MRN: [id number]  TQS:2/88/0635  Age:44 y.o.    HPI: Renea Glass is a 38 y.o. female who originally reported that in late Jan, 2020 she developed a 9-day period of severe constant RUQ pain with radiation to her back associated with N/V. She had a liver panel on 1/27/20 with T Bili 1.2, alk phosp 236, AST 87 , WBC 16.8 although these numbers are little difficult to read from the scanned in documents. They were drawn at an outside facility. Her pain resolved eventually. She denies any prior abdominal surgery. She had the below US performed. Cholecystectomy and liver biopsy was offered and recommended in February 2020, but she did not want to proceed related to insurance coverage. She presented to the ER on 3/23/20 and was admitted after she developed a 3-day history of worsening, severe, constant, periumbilical pain which began after she ate ice cream.    Nothing in particular made her pain better or worse. She had associated nausea but no vomiting. She was found to have a markedly elevated LFTs with transaminases >1000; T bIli 2.9 and lipase 26,201      3/23/20 RUQ and US  Hx:  Acute moderate pain right upper abdomen and both lower quadrants;  with elevated lipase and elevated liver function tests, leukocytosis,  hyperbilirubinemia.     FINDINGS: There are no discrete lesions in the visualized portions of the liver. Liver size is 14.0 cm, within normal limits.     Main portal vein is patent on Doppler imaging.     There is no visualized choledocholithiasis or bile duct dilatation. The common  bile duct diameter is 3 mm. Gallbladder demonstrates multiple intraluminal  layering stones, and borderline 3 mm wall thickening but no surrounding fluid.    Sonographic Leblanc's sign is not positive during the exam.     There are no discrete lesions in the limited visualized portions of the pancreas.      The right kidney measures 9.8 cm in length. There is no hydronephrosis. Color  Doppler demonstrates expected right renal flow. There is no evidence of a solid  renal mass.      There is no evidence of ascites. The right and left lower quadrant appear  grossly unremarkable as seen by ultrasound.     The aorta and IVC are patent on Doppler imaging. Aortic diameter is within normal limits.        IMPRESSION:   1. Gallstones. 2. No biliary dilatation. 3/23/20 CT abd/pelvis with IV but no oral contrast  Hx:  Acute vomiting, severe pain right and left lower  quadrants. History includes gallstones. Labs demonstrate leukocytosis,  hyperbilirubinemia, elevated liver function tests.     FINDINGS: Partially included lung bases are unremarkable.     Abdomen:  No suspicious lesions in the liver or spleen. Again demonstrated are  gallstones, with no evidence of surrounding inflammation. Common bile duct is  borderline dilated measuring up to 7-8 mm diameter. The adrenal glands and  pancreas demonstrate no discrete mass. There is mild to moderate inflammatory  stranding and trace fluid surrounding the majority of the pancreas, but no  evidence of an associated cystic or solid mass. Tiny round hypodensities in the  renal cortices are too small to characterize, statistically benign incidental  cysts. There is normal enhancement of the kidneys, no hydronephrosis.       Normal appendix. Majority of large bowel is decompressed, with mild to moderate  wall thickening noted throughout but no prominent surrounding inflammation. No  lymphadenopathy. No free air, or focal fluid collections in the abdomen. Aorta  normal caliber. Patent major vessels.     A small hiatal hernia is noted. There is no evidence of bowel obstruction. GI  evaluation is limited without oral contrast.     Pelvis:  No acute inflammatory changes or fluid collections in the pelvis.   Uterus and endometrium appear somewhat heterogeneous, nonspecific and could be  physiologic given patient age unless there is clinical suspicion. Uterus and  ovaries are not enlarged. Urinary bladder is unremarkable grossly. No  lymphadenopathy or mass in the pelvis.     Bones: No acute or suspicious osseous lesions.        IMPRESSION:   1. Pancreatitis with no evidence of associated mass or abscess. 2. Gallstones, borderline common bile duct dilatation. 3. Wall thickening of majority of large bowel is most likely underdistention  artifact (although low-grade colitis could have a similar appearance). 4. Small hiatal hernia. GI details are limited without oral contrast          GI proceeded with ERCP as noted below. 3/23/20 s/p ERCP with CBD stone extraction; Dr Ana Headley    Additional hx:  3/25/20 did not tolerate PO yesterday; became bloated; NPO since yesterday afternoon; LFTs better; WBC better. Plan to proceed with cholecystectomy            Past Medical History:   Diagnosis Date    Anxiety     Depression     Insomnia      History reviewed. No pertinent surgical history. Current Facility-Administered Medications   Medication Dose Route Frequency    dextrose 5% - 0.45% NaCl with KCl 20 mEq/L infusion  100 mL/hr IntraVENous CONTINUOUS    ceFAZolin (ANCEF) 2 g/20 mL in sterile water IV syringe  2 g IntraVENous ON CALL TO OR    ibuprofen (MOTRIN) tablet 400 mg  400 mg Oral Q6H PRN    sodium chloride (NS) flush 5-40 mL  5-40 mL IntraVENous Q8H    sodium chloride (NS) flush 5-40 mL  5-40 mL IntraVENous PRN    ondansetron (ZOFRAN) injection 4 mg  4 mg IntraVENous Q4H PRN    senna-docusate (PERICOLACE) 8.6-50 mg per tablet 1 Tab  1 Tab Oral DAILY    HYDROmorphone (PF) (DILAUDID) injection 1 mg  1 mg IntraVENous Q4H PRN    LORazepam (ATIVAN) injection 1 mg  1 mg IntraVENous BID PRN    pantoprazole (PROTONIX) tablet 40 mg  40 mg Oral ACB     Patient has no known allergies.   Social History     Socioeconomic History    Marital status:      Spouse name: Not on file    Number of children: Not on file    Years of education: Not on file    Highest education level: Not on file   Tobacco Use    Smoking status: Never Smoker    Smokeless tobacco: Never Used   Substance and Sexual Activity    Alcohol use: No    Drug use: No     Social History     Tobacco Use   Smoking Status Never Smoker   Smokeless Tobacco Never Used     History reviewed. No pertinent family history. ROS: The patient has no difficulty with chest pain or shortness of breath. No fever or chills. Comprehensive review of systems was otherwise unremarkable except as noted above. Physical Exam:   Visit Vitals  /72 (BP 1 Location: Left arm, BP Patient Position: Head of bed elevated (Comment degrees); At rest)   Pulse 72   Temp 97.9 °F (36.6 °C)   Resp 18   Ht 5' 5\" (1.651 m)   Wt 172 lb 8 oz (78.2 kg)   SpO2 95%   BMI 28.71 kg/m²     Vitals:    03/24/20 1846 03/24/20 2026 03/25/20 0009 03/25/20 0350   BP:  103/70 110/68 112/72   Pulse:  75 79 72   Resp:  18 18 18   Temp: 100 °F (37.8 °C) 98.9 °F (37.2 °C) 98.2 °F (36.8 °C) 97.9 °F (36.6 °C)   SpO2:  92% 95% 95%   Weight:    172 lb 8 oz (78.2 kg)   Height:         [unfilled]  [unfilled]    Constitutional: Alert, oriented, cooperative patient in no acute distress; appears stated age    Eyes:Sclera are clear. EOMs intact  ENMT: no external lesions gross hearing normal; no obvious neck masses, no ear or lip lesions, nares normal  CV: RRR. Normal perfusion  Resp: No JVD. Breathing is  non-labored; no audible wheezing. GI: more distended today after trying PO yesterday; non-tender     Musculoskeletal: unremarkable with normal function. No embolic signs or cyanosis.    Neuro:  Oriented; moves all 4; no focal deficits  Psychiatric: normal affect and mood, no memory impairment    Recent vitals (if inpt):  @IPVITALS(24:)@    Labs:  Recent Labs     03/25/20  0501  03/23/20  0923   WBC 12.9*   < > 13.4*   HGB 11.0*   < > 13.9      < > 424      < > 139   K 3.0*   < > 3.8    < > 103   CO2 34*   < > 28   BUN 3*   < > 11   CREA 0.57*   < > 0.96   GLU 97   < > 185*   TBILI 1.2*   < > 2.9*   SGOT 217*   < > 1,019*   *   < > 1,279*   *   < > 378*   LPSE  --   --  26,201*    < > = values in this interval not displayed. Lab Results   Component Value Date/Time    WBC 12.9 (H) 03/25/2020 05:01 AM    HGB 11.0 (L) 03/25/2020 05:01 AM    PLATELET 558 20/53/9682 05:01 AM    Sodium 141 03/25/2020 05:01 AM    Potassium 3.0 (L) 03/25/2020 05:01 AM    Chloride 106 03/25/2020 05:01 AM    CO2 34 (H) 03/25/2020 05:01 AM    BUN 3 (L) 03/25/2020 05:01 AM    Creatinine 0.57 (L) 03/25/2020 05:01 AM    Glucose 97 03/25/2020 05:01 AM    INR 1.0 09/27/2013 07:51 AM    aPTT 25.4 09/27/2013 07:51 AM    Bilirubin, total 1.2 (H) 03/25/2020 05:01 AM    AST (SGOT) 217 (H) 03/25/2020 05:01 AM    ALT (SGPT) 565 (H) 03/25/2020 05:01 AM    Alk. phosphatase 221 (H) 03/25/2020 05:01 AM    Lipase 26,201 (H) 03/23/2020 09:23 AM       CT Results  (Last 48 hours)               03/23/20 1149  CT ABD PELV W CONT Final result    Impression:  IMPRESSION:    1. Pancreatitis with no evidence of associated mass or abscess. 2. Gallstones, borderline common bile duct dilatation. 3. Wall thickening of majority of large bowel is most likely underdistention   artifact (although low-grade colitis could have a similar appearance). 4. Small hiatal hernia. GI details are limited without oral contrast.       Narrative:  CT of the Abdomen and Pelvis with contrast       CLINICAL INDICATION:  Acute vomiting, severe pain right and left lower   quadrants. History includes gallstones. Labs demonstrate leukocytosis,   hyperbilirubinemia, elevated liver function tests. COMPARISON: Ultrasound abdomen performed earlier today       TECHNIQUE: Automated exposure Control was used. Multiple axial images were   obtained through the abdomen and pelvis after intravenous injection of 125cc of   isovue 370.  No oral contrast given per request, limiting evaluation of GI tract   and surrounding structures. Coronal reformatted images obtained for further   evaluation of organs. FINDINGS: Partially included lung bases are unremarkable. Abdomen:  No suspicious lesions in the liver or spleen. Again demonstrated are   gallstones, with no evidence of surrounding inflammation. Common bile duct is   borderline dilated measuring up to 7-8 mm diameter. The adrenal glands and   pancreas demonstrate no discrete mass. There is mild to moderate inflammatory   stranding and trace fluid surrounding the majority of the pancreas, but no   evidence of an associated cystic or solid mass. Tiny round hypodensities in the   renal cortices are too small to characterize, statistically benign incidental   cysts. There is normal enhancement of the kidneys, no hydronephrosis. Normal appendix. Majority of large bowel is decompressed, with mild to moderate   wall thickening noted throughout but no prominent surrounding inflammation. No   lymphadenopathy. No free air, or focal fluid collections in the abdomen. Aorta   normal caliber. Patent major vessels. A small hiatal hernia is noted. There is no evidence of bowel obstruction. GI   evaluation is limited without oral contrast.       Pelvis:  No acute inflammatory changes or fluid collections in the pelvis. Uterus and endometrium appear somewhat heterogeneous, nonspecific and could be   physiologic given patient age unless there is clinical suspicion. Uterus and   ovaries are not enlarged. Urinary bladder is unremarkable grossly. No   lymphadenopathy or mass in the pelvis. Bones: No acute or suspicious osseous lesions. chest X-ray      I reviewed recent labs, recent radiologic studies, and pertinent records including other doctor notes if needed. I independently reviewed radiology images for studies I described above or studies I have ordered. Admission date (for inpatients): 3/23/2020   [unfilled]  [unfilled]    ASSESSMENT/PLAN:  Problem List  Date Reviewed: 2/6/2020          Codes Class Noted    Hyperbilirubinemia ICD-10-CM: E80.6  ICD-9-CM: 782.4  3/24/2020        Choledocholithiasis ICD-10-CM: K80.50  ICD-9-CM: 574.50  3/24/2020        * (Principal) Acute gallstone pancreatitis ICD-10-CM: K85.10  ICD-9-CM: 225.8, 574.20  3/23/2020        Chronic calculous cholecystitis ICD-10-CM: K80.10  ICD-9-CM: 574.10  2/6/2020        RUQ pain ICD-10-CM: R10.11  ICD-9-CM: 789.01  2/6/2020        Abnormal liver ultrasound ICD-10-CM: R93.2  ICD-9-CM: 793.3  2/6/2020        Elevated liver enzymes ICD-10-CM: R74.8  ICD-9-CM: 790.5  2/6/2020        Recurrent depression (RUSTca 75.) ICD-10-CM: F33.9  ICD-9-CM: 296.30  1/2/2018        Anxiety ICD-10-CM: F41.9  ICD-9-CM: 300.00  1/2/2018            Principal Problem:    Acute gallstone pancreatitis (3/23/2020)    Active Problems:    Anxiety (1/2/2018)      Chronic calculous cholecystitis (2/6/2020)      Abnormal liver ultrasound (2/6/2020)      Elevated liver enzymes (2/6/2020)      Hyperbilirubinemia (3/24/2020)      Choledocholithiasis (3/24/2020)         She has signs and symptoms suggestive gallstone pancreatitis with elevated LFTs, hyperbilirubinemia, leukocytosis, in addition to abnormal liver on ultrasound worrisome for steatohepatitis. She is s/p ERCP with common duct clearance. LFTs improving daily  Changes LR to maint IVF on 3/25/20 as K+ was 3.0 on 3/25/20        She will require interval cholecystectomy once her pancreatitis and peripancreatic edema have resolved. I discussed her condition with her and discussed procedural risks of laparoscopic cholecystectomy and liver biopsy including bleeding, infection, bile leak, open surgery, injury to bowel or bile duct, persistent symptoms requiring additional studies, hernia, blood clots, risk of anesthesia, etc..   We also discussed risks and benefits of not having surgery and particularly discussed the risk of progression to acute cholecystitis and emergent surgery as well as gangrenous cholecystitis and sepsis should she not have her gallbladder out. All her questions were answered. We decided to proceed with surgery today  OR notified            I have personally performed a face-to-face diagnostic evaluation and management  service on this patient. I have independently seen the patient. I have independently obtained the above history from the patient/family. I have independently examined the patient with above findings. I have independently reviewed data/labs for this patient and developed the above plan of care (MDM). Signed: Jimmie Patel.  Estela Michel MD, FACS

## 2020-03-25 NOTE — PERIOP NOTES
TRANSFER - OUT REPORT:    Verbal report given to Vikki Cerna RN on [employer]   being transferred to [id number] for routine post - op       Report consisted of patients Situation, Background, Assessment and   Recommendations(SBAR). Information from the following report(s) OR Summary, Procedure Summary, Intake/Output and MAR was reviewed with the receiving nurse. Lines:   Peripheral IV 03/25/20 Left;Posterior Wrist (Active)   Site Assessment Clean, dry, & intact 3/25/2020  3:20 PM   Phlebitis Assessment 0 3/25/2020  3:20 PM   Infiltration Assessment 0 3/25/2020  3:20 PM   Dressing Status Clean, dry, & intact 3/25/2020  3:20 PM   Dressing Type Tape;Transparent 3/25/2020  3:20 PM   Hub Color/Line Status Pink;Patent 3/25/2020  3:20 PM   Alcohol Cap Used No 3/25/2020  3:20 PM        Opportunity for questions and clarification was provided. Patient transported with:   O2 @ 3 liters    VTE prophylaxis orders have been written for [employer]. Patient and family given floor number and nurses name. Family updated re: pt status after security code verified.

## 2020-03-25 NOTE — PROGRESS NOTES
TRANSFER - OUT REPORT:    Verbal report given to NEO Mayo on [employer]  being transferred to Pre-op for ordered procedure       Report consisted of patients Situation, Background, Assessment and   Recommendations(SBAR). Information from the following report(s) SBAR was reviewed with the receiving nurse. Lines:   Peripheral IV 03/23/20 Right Antecubital (Active)   Site Assessment Clean, dry, & intact 3/24/2020  7:30 PM   Phlebitis Assessment 0 3/24/2020  7:30 PM   Infiltration Assessment 0 3/24/2020  7:30 PM   Dressing Status Clean, dry, & intact 3/24/2020  7:30 PM   Dressing Type Tape;Transparent 3/24/2020  7:30 PM   Hub Color/Line Status Patent; Flushed 3/24/2020  7:30 PM   Alcohol Cap Used No 3/24/2020  7:30 PM        Opportunity for questions and clarification was provided.       Patient transported with:   [employer]

## 2020-03-25 NOTE — PROGRESS NOTES
H&P/Consult Note/Progress Note/Office Note:   García Ibrahim  MRN: [id number]  JXM:[id number]/80/7204  Age:44 y.o.    HPI: García Ibrahim is a 38 y.o. female who is s/p lap katie, liver biopsy, and drain placement on 3/25/20 for gallstone pancreatitis, elevated LFTs, and abnormal liver echogenicity on abd US at [facility]. Prior to surgery she reported in late Jan, 2020 she developed a 9-day period of severe constant RUQ pain with radiation to her back associated with N/V. She had a liver panel on 1/27/20 with T Bili 1.2, alk phosp 236, AST 87 , WBC 16.8 although these numbers are little difficult to read from the scanned in documents. They were drawn at an outside facility. Her pain resolved eventually. No prior abdominal surgery. She had the below US performed. Cholecystectomy and liver biopsy was offered and recommended in February 2020, but she did not want to proceed related to insurance coverage. She presented to the ER on 3/23/20 and was admitted after she developed a 3-day history of worsening, severe, constant, periumbilical pain which began after she ate ice cream.    Nothing in particular made her pain better or worse. She had associated nausea but no vomiting. She was found to have a markedly elevated LFTs with transaminases >1000; T bIli 2.9 and lipase 26,201      3/23/20 RUQ and US  Hx:  Acute moderate pain right upper abdomen and both lower quadrants;  with elevated lipase and elevated liver function tests, leukocytosis,  hyperbilirubinemia.     FINDINGS: There are no discrete lesions in the visualized portions of the liver. Liver size is 14.0 cm, within normal limits.     Main portal vein is patent on Doppler imaging.     There is no visualized choledocholithiasis or bile duct dilatation. The common  bile duct diameter is 3 mm. Gallbladder demonstrates multiple intraluminal  layering stones, and borderline 3 mm wall thickening but no surrounding fluid. Sonographic Leblanc's sign is not positive during the exam.     There are no discrete lesions in the limited visualized portions of the pancreas.      The right kidney measures 9.8 cm in length. There is no hydronephrosis. Color  Doppler demonstrates expected right renal flow. There is no evidence of a solid  renal mass.      There is no evidence of ascites. The right and left lower quadrant appear  grossly unremarkable as seen by ultrasound.     The aorta and IVC are patent on Doppler imaging. Aortic diameter is within normal limits.        IMPRESSION:   1. Gallstones. 2. No biliary dilatation. 3/23/20 CT abd/pelvis with IV but no oral contrast  Hx:  Acute vomiting, severe pain right and left lower  quadrants. History includes gallstones. Labs demonstrate leukocytosis,  hyperbilirubinemia, elevated liver function tests.     FINDINGS: Partially included lung bases are unremarkable.     Abdomen:  No suspicious lesions in the liver or spleen. Again demonstrated are  gallstones, with no evidence of surrounding inflammation. Common bile duct is  borderline dilated measuring up to 7-8 mm diameter. The adrenal glands and  pancreas demonstrate no discrete mass. There is mild to moderate inflammatory  stranding and trace fluid surrounding the majority of the pancreas, but no  evidence of an associated cystic or solid mass. Tiny round hypodensities in the  renal cortices are too small to characterize, statistically benign incidental  cysts. There is normal enhancement of the kidneys, no hydronephrosis.       Normal appendix. Majority of large bowel is decompressed, with mild to moderate  wall thickening noted throughout but no prominent surrounding inflammation. No  lymphadenopathy. No free air, or focal fluid collections in the abdomen. Aorta  normal caliber. Patent major vessels.     A small hiatal hernia is noted. There is no evidence of bowel obstruction.  GI  evaluation is limited without oral contrast.     Pelvis:  No acute inflammatory changes or fluid collections in the pelvis. Uterus and endometrium appear somewhat heterogeneous, nonspecific and could be  physiologic given patient age unless there is clinical suspicion. Uterus and  ovaries are not enlarged. Urinary bladder is unremarkable grossly. No  lymphadenopathy or mass in the pelvis.     Bones: No acute or suspicious osseous lesions.        IMPRESSION:   1. Pancreatitis with no evidence of associated mass or abscess. 2. Gallstones, borderline common bile duct dilatation. 3. Wall thickening of majority of large bowel is most likely underdistention  artifact (although low-grade colitis could have a similar appearance). 4. Small hiatal hernia. GI details are limited without oral contrast          GI proceeded with ERCP as noted below. 3/23/20 s/p ERCP with CBD stone extraction; Dr Wyatt Peterson    Additional hx:  3/25/20 did not tolerate PO yesterday; became bloated; NPO since yesterday afternoon; LFTs better; WBC better. CR today - LC                Past Medical History:   Diagnosis Date    Anxiety     Depression     Insomnia      History reviewed. No pertinent surgical history.   Current Facility-Administered Medications   Medication Dose Route Frequency    dextrose 5% - 0.45% NaCl with KCl 20 mEq/L infusion  100 mL/hr IntraVENous CONTINUOUS    morphine 10 mg/ml injection 2-6 mg  2-6 mg IntraVENous Q1H PRN    acetaminophen (TYLENOL) tablet 1,000 mg  1,000 mg Oral Q6H PRN    oxyCODONE IR (ROXICODONE) tablet 5-10 mg  5-10 mg Oral Q4H PRN    piperacillin-tazobactam (ZOSYN) 4.5 g in 0.9% sodium chloride (MBP/ADV) 100 mL  4.5 g IntraVENous Q8H    ibuprofen (MOTRIN) tablet 400 mg  400 mg Oral Q6H PRN    sodium chloride (NS) flush 5-40 mL  5-40 mL IntraVENous Q8H    sodium chloride (NS) flush 5-40 mL  5-40 mL IntraVENous PRN    ondansetron (ZOFRAN) injection 4 mg  4 mg IntraVENous Q4H PRN    senna-docusate (PERICOLACE) 8.6-50 mg per tablet 1 Tab  1 Tab Oral DAILY    LORazepam (ATIVAN) injection 1 mg  1 mg IntraVENous BID PRN    pantoprazole (PROTONIX) tablet 40 mg  40 mg Oral ACB     Patient has no known allergies. Social History     Socioeconomic History    Marital status:      Spouse name: Not on file    Number of children: Not on file    Years of education: Not on file    Highest education level: Not on file   Tobacco Use    Smoking status: Never Smoker    Smokeless tobacco: Never Used   Substance and Sexual Activity    Alcohol use: No    Drug use: No     Social History     Tobacco Use   Smoking Status Never Smoker   Smokeless Tobacco Never Used     History reviewed. No pertinent family history. ROS: The patient has no difficulty with chest pain or shortness of breath. No fever or chills. Comprehensive review of systems was otherwise unremarkable except as noted above. Physical Exam:   Visit Vitals  /75   Pulse 79   Temp 98.4 °F (36.9 °C)   Resp 18   Ht 5' 5\" (1.651 m)   Wt 172 lb 8 oz (78.2 kg)   SpO2 96%   BMI 28.71 kg/m²     Vitals:    03/25/20 1543 03/25/20 1548 03/25/20 1553 03/25/20 1620   BP: 132/75   120/75   Pulse: 73 70 71 79   Resp: 16 16  18   Temp:  98.9 °F (37.2 °C)  98.4 °F (36.9 °C)   SpO2: 97% 98% 97% 96%   Weight:       Height:         [unfilled]  [unfilled]    Constitutional: Alert, oriented, cooperative patient in no acute distress; appears stated age    Eyes:Sclera are clear. EOMs intact  ENMT: no external lesions gross hearing normal; no obvious neck masses, no ear or lip lesions, nares normal  CV: RRR. Normal perfusion  Resp: No JVD. Breathing is  non-labored; no audible wheezing. GI: dressings dry and intact; Jose Angel drain is serosang  Abd is distened as it was pre-op       Musculoskeletal: unremarkable with normal function. No embolic signs or cyanosis.    Neuro:  Oriented; moves all 4; no focal deficits  Psychiatric: normal affect and mood, no memory impairment    Recent vitals (if inpt): @IPVITALS(24:)@    Labs:  Recent Labs     03/25/20  0501  03/23/20  0923   WBC 12.9*   < > 13.4*   HGB 11.0*   < > 13.9      < > 424      < > 139   K 3.0*   < > 3.8      < > 103   CO2 34*   < > 28   BUN 3*   < > 11   CREA 0.57*   < > 0.96   GLU 97   < > 185*   TBILI 1.2*   < > 2.9*   SGOT 217*   < > 1,019*   *   < > 1,279*   *   < > 378*   LPSE  --   --  26,201*    < > = values in this interval not displayed. Lab Results   Component Value Date/Time    WBC 12.9 (H) 03/25/2020 05:01 AM    HGB 11.0 (L) 03/25/2020 05:01 AM    PLATELET 141 37/75/8436 05:01 AM    Sodium 141 03/25/2020 05:01 AM    Potassium 3.0 (L) 03/25/2020 05:01 AM    Chloride 106 03/25/2020 05:01 AM    CO2 34 (H) 03/25/2020 05:01 AM    BUN 3 (L) 03/25/2020 05:01 AM    Creatinine 0.57 (L) 03/25/2020 05:01 AM    Glucose 97 03/25/2020 05:01 AM    INR 1.0 09/27/2013 07:51 AM    aPTT 25.4 09/27/2013 07:51 AM    Bilirubin, total 1.2 (H) 03/25/2020 05:01 AM    AST (SGOT) 217 (H) 03/25/2020 05:01 AM    ALT (SGPT) 565 (H) 03/25/2020 05:01 AM    Alk. phosphatase 221 (H) 03/25/2020 05:01 AM    Lipase 26,201 (H) 03/23/2020 09:23 AM       CT Results  (Last 48 hours)    None        chest X-ray      I reviewed recent labs, recent radiologic studies, and pertinent records including other doctor notes if needed. I independently reviewed radiology images for studies I described above or studies I have ordered.    Admission date (for inpatients): 3/23/2020   [unfilled]  [unfilled]    ASSESSMENT/PLAN:  Problem List  Date Reviewed: 2/6/2020          Codes Class Noted    Hypokalemia ICD-10-CM: E87.6  ICD-9-CM: 276.8  3/25/2020        Acute pancreatitis ICD-10-CM: K85.90  ICD-9-CM: 577.0  3/25/2020        Hyperbilirubinemia ICD-10-CM: E80.6  ICD-9-CM: 782.4  3/24/2020        Choledocholithiasis ICD-10-CM: K80.50  ICD-9-CM: 574.50  3/24/2020        * (Principal) Acute gallstone pancreatitis ICD-10-CM: K85.10  ICD-9-CM: 881.9, 574.20  3/23/2020    Overview Addendum 3/25/2020  3:47 PM by Pasquale Madsen MD     3/25/20 s/p lap katie, liver biopsy and drain placement; Dr Debbi Caldera             Chronic calculous cholecystitis ICD-10-CM: K80.10  ICD-9-CM: 574.10  2/6/2020        RUQ pain ICD-10-CM: R10.11  ICD-9-CM: 789.01  2/6/2020        Abnormal liver US (at [address]) ICD-10-CM: R93.2  ICD-9-CM: 793.3  2/6/2020        Elevated liver enzymes ICD-10-CM: R74.8  ICD-9-CM: 790.5  2/6/2020        [facility] ICD-10-CM: F33.9  ICD-9-CM: 296.30  1/2/2018        Anxiety ICD-10-CM: F41.9  ICD-9-CM: 300.00  1/2/2018            Principal Problem:    Acute gallstone pancreatitis (3/23/2020)      Overview: 3/25/20 s/p lap katie, liver biopsy and drain placement; Dr Kerri Daniel:    Anxiety (1/2/2018)      Chronic calculous cholecystitis (2/6/2020)      Abnormal liver US (at [address]) (2/6/2020)      Elevated liver enzymes (2/6/2020)      Hyperbilirubinemia (3/24/2020)      Choledocholithiasis (3/24/2020)      Hypokalemia (3/25/2020)      Acute pancreatitis (3/25/2020)           She is s/p lap katie, liver biopsy, and drain placement on 3/25/20 for gallstone pancreatitis,  hyperbilirubinemia, leukocytosis, elevated LFTs, and abnormal liver echogenicity on abd US at [address] She is also s/p ERCP with common duct clearance. LFTs improving daily    Changed LR to maint IVF on 3/25/20 as K+ was 3.0 on 3/25/20      She will start liquids in am  She can be discharged when she proves she can tolerate this. We would not be surprised if she had a several day ileus that prevented her from discharge. She had a lot of swelling intraoperatively noted from her recent pancreatitis. We left a pain Rx on the chart and filled out her discharge instructions. She will go home with her drain. Will sign off for now          I have personally performed a face-to-face diagnostic evaluation and management  service on this patient.       I have independently seen the patient. I have independently obtained the above history from the patient/family. I have independently examined the patient with above findings. I have independently reviewed data/labs for this patient and developed the above plan of care (MDM). Signed: Lizbet Rose.  Sharyle Goldstein, MD, FACS

## 2020-03-25 NOTE — PROGRESS NOTES
Progress Note    Patient: Miguel Rodriguez MRN: [id number]  SSN: [id number]    YOB: 1975  Age: 38 y.o. Sex: female      Admit Date: 3/23/2020    LOS: 2 days     Subjective:     Patient with depression and anxiety. Admitted due to abdominal pain. Found to have gallstones, fatty liver and pancreatitis and possible dilated CBD. She had ERCP on 3-. Found to have CBD stone and sphincterotomy was performed and stones were removed. Surgery was consulted and planned for cholecystectomy today. Patient denies pain today. No fever. No nausea. No vomiting. Objective:     Vitals:    03/24/20 2026 03/25/20 0009 03/25/20 0350 03/25/20 0725   BP: 103/70 110/68 112/72 99/67   Pulse: 75 79 72 83   Resp: 18 18 18 18   Temp: 98.9 °F (37.2 °C) 98.2 °F (36.8 °C) 97.9 °F (36.6 °C) 99.8 °F (37.7 °C)   SpO2: 92% 95% 95% 96%   Weight:   78.2 kg (172 lb 8 oz)    Height:            Intake and Output:  Current Shift: No intake/output data recorded. Last three shifts: 03/23 1901 - 03/25 0700  In: 3602.8 [P.O.:220; I.V.:3382.8]  Out: 3000 [Urine:3000]    Physical Exam:     General:                    The patient is a pleasant female in no acute distress. she is sitting up in bed. Head:                                   Normocephalic/atraumatic. Eyes:                                   No palpebral pallor or scleral icterus. ENT:                                    External auricular and nasal exam within normal limits. Mucous membranes are moist.  Neck:                                   Supple, non-tender, no JVD. Lungs:                       Clear to auscultation bilaterally without wheezes or crackles. No respiratory distress or accessory muscle use. Heart:                                  Regular rate and rhythm, without murmurs, rubs, or gallops.   Abdomen:                  Soft, non-tender, mildly distended with normoactive bowel sounds. Genitourinary:           No tenderness over the bladder or bilateral CVAs. Extremities:               Without clubbing, cyanosis, or edema. Skin:                                    Normal color, texture, and turgor. No rashes, lesions, or jaundice. Pulses:                      Radial and dorsalis pedis pulses present 2+ bilaterally. Capillary refill <2s. Neurologic:                CN II-XII grossly intact and symmetrical.                                               Moving all four extremities well with no focal deficits. Psychiatric:                Pleasant demeanor, appropriate affect. Alert and oriented x 3      Lab/Data Review:    Recent Results (from the past 24 hour(s))   METABOLIC PANEL, COMPREHENSIVE    Collection Time: 03/25/20  5:01 AM   Result Value Ref Range    Sodium 141 136 - 145 mmol/L    Potassium 3.0 (L) 3.5 - 5.1 mmol/L    Chloride 106 98 - 107 mmol/L    CO2 34 (H) 21 - 32 mmol/L    Anion gap 1 (L) 7 - 16 mmol/L    Glucose 97 65 - 100 mg/dL    BUN 3 (L) 6 - 23 MG/DL    Creatinine 0.57 (L) 0.6 - 1.0 MG/DL    GFR est AA >60 >60 ml/min/1.73m2    GFR est non-AA >60 >60 ml/min/1.73m2    Calcium 8.2 (L) 8.3 - 10.4 MG/DL    Bilirubin, total 1.2 (H) 0.2 - 1.1 MG/DL    ALT (SGPT) 565 (H) 12 - 65 U/L    AST (SGOT) 217 (H) 15 - 37 U/L    Alk.  phosphatase 221 (H) 50 - 136 U/L    Protein, total 5.9 (L) 6.3 - 8.2 g/dL    Albumin 2.5 (L) 3.5 - 5.0 g/dL    Globulin 3.4 2.3 - 3.5 g/dL    A-G Ratio 0.7 (L) 1.2 - 3.5     CBC WITH AUTOMATED DIFF    Collection Time: 03/25/20  5:01 AM   Result Value Ref Range    WBC 12.9 (H) 4.3 - 11.1 K/uL    RBC 3.85 (L) 4.05 - 5.2 M/uL    HGB 11.0 (L) 11.7 - 15.4 g/dL    HCT 34.8 (L) 35.8 - 46.3 %    MCV 90.4 79.6 - 97.8 FL    MCH 28.6 26.1 - 32.9 PG    MCHC 31.6 31.4 - 35.0 g/dL    RDW 12.4 11.9 - 14.6 %    PLATELET 551 493 - 903 K/uL    MPV 9.3 (L) 9.4 - 12.3 FL    ABSOLUTE NRBC 0. 00 0.0 - 0.2 K/uL    DF AUTOMATED      NEUTROPHILS 68 43 - 78 %    LYMPHOCYTES 19 13 - 44 %    MONOCYTES 10 4.0 - 12.0 %    EOSINOPHILS 3 0.5 - 7.8 %    BASOPHILS 0 0.0 - 2.0 %    IMMATURE GRANULOCYTES 0 0.0 - 5.0 %    ABS. NEUTROPHILS 8.7 (H) 1.7 - 8.2 K/UL    ABS. LYMPHOCYTES 2.4 0.5 - 4.6 K/UL    ABS. MONOCYTES 1.3 0.1 - 1.3 K/UL    ABS. EOSINOPHILS 0.4 0.0 - 0.8 K/UL    ABS. BASOPHILS 0.1 0.0 - 0.2 K/UL    ABS. IMM. GRANS. 0.0 0.0 - 0.5 K/UL     CT abdomen and pelvis   3-  IMPRESSION:   1. Pancreatitis with no evidence of associated mass or abscess. 2. Gallstones, borderline common bile duct dilatation. 3. Wall thickening of majority of large bowel is most likely underdistention  artifact (although low-grade colitis could have a similar appearance). 4. Small hiatal hernia.     ERCP   3-  IMPRESSION: Choledocholithiasis.       Current Facility-Administered Medications:     dextrose 5% - 0.45% NaCl with KCl 20 mEq/L infusion, 100 mL/hr, IntraVENous, CONTINUOUS, Matias Ramirez MD, Last Rate: 100 mL/hr at 03/25/20 0827, 100 mL/hr at 03/25/20 0827    ceFAZolin (ANCEF) 2 g/20 mL in sterile water IV syringe, 2 g, IntraVENous, ON CALL TO OR, Matias Ramirez MD    ibuprofen (MOTRIN) tablet 400 mg, 400 mg, Oral, Q6H PRN, Taj Alfaro MD, 400 mg at 03/24/20 1813    sodium chloride (NS) flush 5-40 mL, 5-40 mL, IntraVENous, Q8H, Butch Pham DO, 10 mL at 03/25/20 0641    sodium chloride (NS) flush 5-40 mL, 5-40 mL, IntraVENous, PRN, CiescoButch, DO    ondansetron (ZOFRAN) injection 4 mg, 4 mg, IntraVENous, Q4H PRN, Butch Pham DO    senna-docusate (PERICOLACE) 8.6-50 mg per tablet 1 Tab, 1 Tab, Oral, DAILY, Butch Pham DO, 1 Tab at 03/25/20 0826    HYDROmorphone (PF) (DILAUDID) injection 1 mg, 1 mg, IntraVENous, Q4H PRN, Butch Pham DO, 1 mg at 03/25/20 0143    LORazepam (ATIVAN) injection 1 mg, 1 mg, IntraVENous, BID PRN, Butch Pham DO, 1 mg at 03/23/20 3258   pantoprazole (PROTONIX) tablet 40 mg, 40 mg, Oral, ACB, Ciesco, Butch, DO, Stopped at 03/25/20 3934      Assessment:     Principal Problem:    Acute gallstone pancreatitis (3/23/2020)    Active Problems:    Anxiety (1/2/2018)      Chronic calculous cholecystitis (2/6/2020)      Abnormal liver ultrasound (2/6/2020)      Elevated liver enzymes (2/6/2020)      Hyperbilirubinemia (3/24/2020)      Choledocholithiasis (3/24/2020)    hypokalemia     Plan:     Acute pancreatitis   Gallstones and CBD stones. S/P ERCP and CBD stones removal.   Continue NPO, IV fluid. Symptomatic control. Surgery service is following for cholecystectomy today. Hypokalemia   Will replace. I have discussed the plan of care with patient.       DVT prophylaxis : SCD     Signed By: Brayden Moore MD     March 25, 2020

## 2020-03-25 NOTE — PERIOP NOTES
Pt's O2 dropped to 89%. Pt resting quietly with eyes closed. Placed on 2 liters NC.  Sat increased to 98%

## 2020-03-26 LAB
ALBUMIN SERPL-MCNC: 3.1 G/DL (ref 3.5–5)
ALBUMIN/GLOB SERPL: 0.8 {RATIO} (ref 1.2–3.5)
ALP SERPL-CCNC: 221 U/L (ref 50–136)
ALT SERPL-CCNC: 481 U/L (ref 12–65)
ANION GAP SERPL CALC-SCNC: 7 MMOL/L (ref 7–16)
AST SERPL-CCNC: 169 U/L (ref 15–37)
BILIRUB SERPL-MCNC: 0.7 MG/DL (ref 0.2–1.1)
BUN SERPL-MCNC: 6 MG/DL (ref 6–23)
CALCIUM SERPL-MCNC: 8.7 MG/DL (ref 8.3–10.4)
CHLORIDE SERPL-SCNC: 106 MMOL/L (ref 98–107)
CO2 SERPL-SCNC: 27 MMOL/L (ref 21–32)
CREAT SERPL-MCNC: 0.75 MG/DL (ref 0.6–1)
ERYTHROCYTE [DISTWIDTH] IN BLOOD BY AUTOMATED COUNT: 12 % (ref 11.9–14.6)
GLOBULIN SER CALC-MCNC: 3.8 G/DL (ref 2.3–3.5)
GLUCOSE SERPL-MCNC: 113 MG/DL (ref 65–100)
HCT VFR BLD AUTO: 36.4 % (ref 35.8–46.3)
HGB BLD-MCNC: 11.7 G/DL (ref 11.7–15.4)
MCH RBC QN AUTO: 29 PG (ref 26.1–32.9)
MCHC RBC AUTO-ENTMCNC: 32.1 G/DL (ref 31.4–35)
MCV RBC AUTO: 90.1 FL (ref 79.6–97.8)
NRBC # BLD: 0 K/UL (ref 0–0.2)
PLATELET # BLD AUTO: 371 K/UL (ref 150–450)
PMV BLD AUTO: 9.3 FL (ref 9.4–12.3)
POTASSIUM SERPL-SCNC: 3.4 MMOL/L (ref 3.5–5.1)
PROT SERPL-MCNC: 6.9 G/DL (ref 6.3–8.2)
RBC # BLD AUTO: 4.04 M/UL (ref 4.05–5.2)
SODIUM SERPL-SCNC: 140 MMOL/L (ref 136–145)
WBC # BLD AUTO: 20.1 K/UL (ref 4.3–11.1)

## 2020-03-26 PROCEDURE — 74011250636 HC RX REV CODE- 250/636: Performed by: SURGERY

## 2020-03-26 PROCEDURE — 65270000029 HC RM PRIVATE

## 2020-03-26 PROCEDURE — 74011250637 HC RX REV CODE- 250/637: Performed by: SURGERY

## 2020-03-26 PROCEDURE — 85027 COMPLETE CBC AUTOMATED: CPT

## 2020-03-26 PROCEDURE — 80053 COMPREHEN METABOLIC PANEL: CPT

## 2020-03-26 PROCEDURE — 74011250637 HC RX REV CODE- 250/637: Performed by: FAMILY MEDICINE

## 2020-03-26 PROCEDURE — 74011000258 HC RX REV CODE- 258: Performed by: SURGERY

## 2020-03-26 PROCEDURE — 36415 COLL VENOUS BLD VENIPUNCTURE: CPT

## 2020-03-26 RX ORDER — POLYETHYLENE GLYCOL 3350 17 G/17G
17 POWDER, FOR SOLUTION ORAL ONCE
Status: COMPLETED | OUTPATIENT
Start: 2020-03-26 | End: 2020-03-26

## 2020-03-26 RX ADMIN — ACETAMINOPHEN 1000 MG: 500 TABLET, FILM COATED ORAL at 08:32

## 2020-03-26 RX ADMIN — OXYCODONE HYDROCHLORIDE 5 MG: 5 TABLET ORAL at 00:40

## 2020-03-26 RX ADMIN — MORPHINE SULFATE 3 MG: 10 INJECTION, SOLUTION INTRAMUSCULAR; INTRAVENOUS at 19:12

## 2020-03-26 RX ADMIN — DEXTROSE MONOHYDRATE, SODIUM CHLORIDE, AND POTASSIUM CHLORIDE 100 ML/HR: 50; 4.5; 1.49 INJECTION, SOLUTION INTRAVENOUS at 08:28

## 2020-03-26 RX ADMIN — POLYETHYLENE GLYCOL 3350 17 G: 17 POWDER, FOR SOLUTION ORAL at 21:52

## 2020-03-26 RX ADMIN — OXYCODONE HYDROCHLORIDE 10 MG: 5 TABLET ORAL at 21:58

## 2020-03-26 RX ADMIN — Medication 10 ML: at 06:30

## 2020-03-26 RX ADMIN — ACETAMINOPHEN 1000 MG: 500 TABLET, FILM COATED ORAL at 14:37

## 2020-03-26 RX ADMIN — SENNOSIDES AND DOCUSATE SODIUM 1 TABLET: 8.6; 5 TABLET ORAL at 08:27

## 2020-03-26 RX ADMIN — OXYCODONE HYDROCHLORIDE 5 MG: 5 TABLET ORAL at 04:55

## 2020-03-26 RX ADMIN — PIPERACILLIN AND TAZOBACTAM 4.5 G: 4; .5 INJECTION, POWDER, FOR SOLUTION INTRAVENOUS at 17:41

## 2020-03-26 RX ADMIN — PANTOPRAZOLE SODIUM 40 MG: 40 TABLET, DELAYED RELEASE ORAL at 04:55

## 2020-03-26 RX ADMIN — DEXTROSE MONOHYDRATE, SODIUM CHLORIDE, AND POTASSIUM CHLORIDE 100 ML/HR: 50; 4.5; 1.49 INJECTION, SOLUTION INTRAVENOUS at 21:48

## 2020-03-26 RX ADMIN — Medication 10 ML: at 21:52

## 2020-03-26 RX ADMIN — PIPERACILLIN AND TAZOBACTAM 4.5 G: 4; .5 INJECTION, POWDER, FOR SOLUTION INTRAVENOUS at 09:42

## 2020-03-26 RX ADMIN — PIPERACILLIN AND TAZOBACTAM 4.5 G: 4; .5 INJECTION, POWDER, FOR SOLUTION INTRAVENOUS at 02:18

## 2020-03-26 NOTE — PROGRESS NOTES
Hourly rounds completed. All needs met. Pt c/o pain. Interventions per MAR. Educated pt to save urine for measurement. Gave report to the oncoming day shift nurse.

## 2020-03-26 NOTE — PROGRESS NOTES
H&P/Consult Note/Progress Note/Office Note:   Marcia Loredo  MRN: [id number]  BPO:7/72/7332  Age:44 y.o.    HPI: Marcia Loredo is a 38 y.o. female who is s/p lap katie, liver biopsy, and drain placement on 3/25/20 for gallstone pancreatitis, elevated LFTs, and abnormal liver echogenicity on abd US at [facility]. Prior to surgery she reported in late Jan, 2020 she developed a 9-day period of severe constant RUQ pain with radiation to her back associated with N/V. She had a liver panel on 1/27/20 with T Bili 1.2, alk phosp 236, AST 87 , WBC 16.8 although these numbers are little difficult to read from the scanned in documents. They were drawn at an outside facility. Her pain resolved eventually. No prior abdominal surgery. She had the below US performed. Cholecystectomy and liver biopsy was offered and recommended in February 2020, but she did not want to proceed related to insurance coverage. She presented to the ER on 3/23/20 and was admitted after she developed a 3-day history of worsening, severe, constant, periumbilical pain which began after she ate ice cream.    Nothing in particular made her pain better or worse. She had associated nausea but no vomiting. She was found to have a markedly elevated LFTs with transaminases >1000; T bIli 2.9 and lipase 26,201      3/23/20 RUQ and US  Hx:  Acute moderate pain right upper abdomen and both lower quadrants;  with elevated lipase and elevated liver function tests, leukocytosis,  hyperbilirubinemia.     FINDINGS: There are no discrete lesions in the visualized portions of the liver. Liver size is 14.0 cm, within normal limits.     Main portal vein is patent on Doppler imaging.     There is no visualized choledocholithiasis or bile duct dilatation. The common  bile duct diameter is 3 mm. Gallbladder demonstrates multiple intraluminal  layering stones, and borderline 3 mm wall thickening but no surrounding fluid. Sonographic Leblanc's sign is not positive during the exam.     There are no discrete lesions in the limited visualized portions of the pancreas.      The right kidney measures 9.8 cm in length. There is no hydronephrosis. Color  Doppler demonstrates expected right renal flow. There is no evidence of a solid  renal mass.      There is no evidence of ascites. The right and left lower quadrant appear  grossly unremarkable as seen by ultrasound.     The aorta and IVC are patent on Doppler imaging. Aortic diameter is within normal limits.        IMPRESSION:   1. Gallstones. 2. No biliary dilatation. 3/23/20 CT abd/pelvis with IV but no oral contrast  Hx:  Acute vomiting, severe pain right and left lower  quadrants. History includes gallstones. Labs demonstrate leukocytosis,  hyperbilirubinemia, elevated liver function tests.     FINDINGS: Partially included lung bases are unremarkable.     Abdomen:  No suspicious lesions in the liver or spleen. Again demonstrated are  gallstones, with no evidence of surrounding inflammation. Common bile duct is  borderline dilated measuring up to 7-8 mm diameter. The adrenal glands and  pancreas demonstrate no discrete mass. There is mild to moderate inflammatory  stranding and trace fluid surrounding the majority of the pancreas, but no  evidence of an associated cystic or solid mass. Tiny round hypodensities in the  renal cortices are too small to characterize, statistically benign incidental  cysts. There is normal enhancement of the kidneys, no hydronephrosis.       Normal appendix. Majority of large bowel is decompressed, with mild to moderate  wall thickening noted throughout but no prominent surrounding inflammation. No  lymphadenopathy. No free air, or focal fluid collections in the abdomen. Aorta  normal caliber. Patent major vessels.     A small hiatal hernia is noted. There is no evidence of bowel obstruction.  GI  evaluation is limited without oral contrast.     Pelvis:  No acute inflammatory changes or fluid collections in the pelvis. Uterus and endometrium appear somewhat heterogeneous, nonspecific and could be  physiologic given patient age unless there is clinical suspicion. Uterus and  ovaries are not enlarged. Urinary bladder is unremarkable grossly. No  lymphadenopathy or mass in the pelvis.     Bones: No acute or suspicious osseous lesions.        IMPRESSION:   1. Pancreatitis with no evidence of associated mass or abscess. 2. Gallstones, borderline common bile duct dilatation. 3. Wall thickening of majority of large bowel is most likely underdistention  artifact (although low-grade colitis could have a similar appearance). 4. Small hiatal hernia. GI details are limited without oral contrast          GI proceeded with ERCP as noted below. 3/23/20 s/p ERCP with CBD stone extraction; Dr Tere Li    Additional hx:  3/25/20 did not tolerate PO yesterday; became bloated; NPO since yesterday afternoon; LFTs better; WBC better. CR today - LC                Past Medical History:   Diagnosis Date    Anxiety     Depression     Insomnia      History reviewed. No pertinent surgical history.   Current Facility-Administered Medications   Medication Dose Route Frequency    dextrose 5% - 0.45% NaCl with KCl 20 mEq/L infusion  100 mL/hr IntraVENous CONTINUOUS    morphine 10 mg/ml injection 2-6 mg  2-6 mg IntraVENous Q1H PRN    acetaminophen (TYLENOL) tablet 1,000 mg  1,000 mg Oral Q6H PRN    oxyCODONE IR (ROXICODONE) tablet 5-10 mg  5-10 mg Oral Q4H PRN    piperacillin-tazobactam (ZOSYN) 4.5 g in 0.9% sodium chloride (MBP/ADV) 100 mL  4.5 g IntraVENous Q8H    lip protectant (BLISTEX) ointment 1 Each  1 Each Topical PRN    sodium chloride (NS) flush 5-40 mL  5-40 mL IntraVENous Q8H    sodium chloride (NS) flush 5-40 mL  5-40 mL IntraVENous PRN    ondansetron (ZOFRAN) injection 4 mg  4 mg IntraVENous Q4H PRN    senna-docusate (PERICOLACE) 8.6-50 mg per tablet 1 Tab  1 Tab Oral DAILY    LORazepam (ATIVAN) injection 1 mg  1 mg IntraVENous BID PRN    pantoprazole (PROTONIX) tablet 40 mg  40 mg Oral ACB     Patient has no known allergies. Social History     Socioeconomic History    Marital status:      Spouse name: Not on file    Number of children: Not on file    Years of education: Not on file    Highest education level: Not on file   Tobacco Use    Smoking status: Never Smoker    Smokeless tobacco: Never Used   Substance and Sexual Activity    Alcohol use: No    Drug use: No     Social History     Tobacco Use   Smoking Status Never Smoker   Smokeless Tobacco Never Used     History reviewed. No pertinent family history. ROS: The patient has no difficulty with chest pain or shortness of breath. No fever or chills. Comprehensive review of systems was otherwise unremarkable except as noted above. Physical Exam:   Visit Vitals  /80 (BP 1 Location: Right arm, BP Patient Position: Sitting)   Pulse 79   Temp 98.4 °F (36.9 °C)   Resp 18   Ht 5' 5\" (1.651 m)   Wt 178 lb 6.4 oz (80.9 kg)   SpO2 100%   BMI 29.69 kg/m²     Vitals:    03/25/20 2348 03/26/20 0428 03/26/20 0742 03/26/20 1145   BP: 120/77 109/68 112/71 129/80   Pulse: 81 79 (!) 103 79   Resp: 18 18 18 18   Temp: 98 °F (36.7 °C) 98 °F (36.7 °C) 98.4 °F (36.9 °C) 98.4 °F (36.9 °C)   SpO2: 97% 96% 95% 100%   Weight:  178 lb 6.4 oz (80.9 kg)     Height:         [unfilled]  [unfilled]    Constitutional: Alert, oriented, cooperative patient in no acute distress; appears stated age    Eyes:Sclera are clear. EOMs intact  ENMT: no external lesions gross hearing normal; no obvious neck masses, no ear or lip lesions, nares normal  CV: RRR. Normal perfusion  Resp: No JVD. Breathing is  non-labored; no audible wheezing. GI: dressings dry and intact; Jose Angel drain is serosang  Abd is distened as it was pre-op       Musculoskeletal: unremarkable with normal function.  No embolic signs or cyanosis. Neuro:  Oriented; moves all 4; no focal deficits  Psychiatric: normal affect and mood, no memory impairment    Recent vitals (if inpt):  @IPVITALS(24:)@    Labs:  Recent Labs     03/26/20  0423   WBC 20.1*   HGB 11.7         K 3.4*      CO2 27   BUN 6   CREA 0.75   *   TBILI 0.7   SGOT 169*   *   *       Lab Results   Component Value Date/Time    WBC 20.1 (H) 03/26/2020 04:23 AM    HGB 11.7 03/26/2020 04:23 AM    PLATELET 524 79/07/2959 04:23 AM    Sodium 140 03/26/2020 04:23 AM    Potassium 3.4 (L) 03/26/2020 04:23 AM    Chloride 106 03/26/2020 04:23 AM    CO2 27 03/26/2020 04:23 AM    BUN 6 03/26/2020 04:23 AM    Creatinine 0.75 03/26/2020 04:23 AM    Glucose 113 (H) 03/26/2020 04:23 AM    INR 1.0 09/27/2013 07:51 AM    aPTT 25.4 09/27/2013 07:51 AM    Bilirubin, total 0.7 03/26/2020 04:23 AM    AST (SGOT) 169 (H) 03/26/2020 04:23 AM    ALT (SGPT) 481 (H) 03/26/2020 04:23 AM    Alk. phosphatase 221 (H) 03/26/2020 04:23 AM    Lipase 26,201 (H) 03/23/2020 09:23 AM       CT Results  (Last 48 hours)    None        chest X-ray      I reviewed recent labs, recent radiologic studies, and pertinent records including other doctor notes if needed. I independently reviewed radiology images for studies I described above or studies I have ordered.    Admission date (for inpatients): 3/23/2020   [unfilled]  [unfilled]    ASSESSMENT/PLAN:  Problem List  Date Reviewed: 2/6/2020          Codes Class Noted    Hypokalemia ICD-10-CM: E87.6  ICD-9-CM: 276.8  3/25/2020        Acute pancreatitis ICD-10-CM: K85.90  ICD-9-CM: 577.0  3/25/2020        Hyperbilirubinemia ICD-10-CM: E80.6  ICD-9-CM: 782.4  3/24/2020        Choledocholithiasis ICD-10-CM: K80.50  ICD-9-CM: 574.50  3/24/2020        * (Principal) Acute gallstone pancreatitis ICD-10-CM: K85.10  ICD-9-CM: 390.5, 574.20  3/23/2020    Overview Addendum 3/25/2020  3:47 PM by Lia Cole MD     3/25/20 s/p lap katie, liver biopsy and drain placement; Dr Adenike Scruggs             Chronic calculous cholecystitis ICD-10-CM: K80.10  ICD-9-CM: 574.10  2/6/2020        RUQ pain ICD-10-CM: R10.11  ICD-9-CM: 789.01  2/6/2020        Abnormal liver US (at [address]) ICD-10-CM: R93.2  ICD-9-CM: 793.3  2/6/2020        Elevated liver enzymes ICD-10-CM: R74.8  ICD-9-CM: 790.5  2/6/2020        [facility] ICD-10-CM: F33.9  ICD-9-CM: 296.30  1/2/2018        Anxiety ICD-10-CM: F41.9  ICD-9-CM: 300.00  1/2/2018            Principal Problem:    Acute gallstone pancreatitis (3/23/2020)      Overview: 3/25/20 s/p lap katie, liver biopsy and drain placement; Dr Adenike Scruggs    Active Problems:    Anxiety (1/2/2018)      Chronic calculous cholecystitis (2/6/2020)      Abnormal liver US (at [address]) (2/6/2020)      Elevated liver enzymes (2/6/2020)      Hyperbilirubinemia (3/24/2020)      Choledocholithiasis (3/24/2020)      Hypokalemia (3/25/2020)      Acute pancreatitis (3/25/2020)             She is s/p lap katie, liver biopsy, and drain placement on 3/25/20 for gallstone pancreatitis, hyperbilirubinemia, leukocytosis, elevated LFTs, and abnormal liver echogenicity on abd US at [address] She is also s/p ERCP with common duct clearance. LFTs improving daily  T Bili normal  Changed LR to maint IVF on 3/25/20 as K+ was 3.0 on 3/25/20      Started liquid diet on POD1 (3/26/20) but having some bloating likely related to expected ileus from pancreatitis  WBC was higher on 3/26/20 - repeat am    She can be discharged when she proves she can tolerate PO  We would not be surprised if she had a several day ileus that prevented her from discharge. She had a lot of swelling intraoperatively noted from her recent pancreatitis. We left a pain Rx on the chart and filled out her discharge instructions. She will go home with her drain.   Surgical discharge instructions written    Will sign off for now - call if needed            I have personally performed a face-to-face diagnostic evaluation and management  service on this patient. I have independently seen the patient. I have independently obtained the above history from the patient/family. I have independently examined the patient with above findings. I have independently reviewed data/labs for this patient and developed the above plan of care (MDM). Signed: Esa Nair.  Debbi Caldera MD, FACS

## 2020-03-26 NOTE — PROGRESS NOTES
03/26/20 [facility] [facility]   Reason for Consult Initial visit; Initial/Spiritual assessment, patient floor   Time In 1650   Time Out 1655   Total Time (in minutes) 5   Pastoral Interventions   Patient Interventions   (Pt sleeping. Did not wake.  Please consult Spiritual Care as needed.)

## 2020-03-26 NOTE — PROGRESS NOTES
Interdisciplinary Rounds completed. Nursing, Case Management, and Physician  present. Plan of care reviewed and updated. Surgery has signed off. Will go home with her drain. Slowly increasing diet. Should d/c 1-2  More days.

## 2020-03-26 NOTE — PROGRESS NOTES
Progress Note    Patient: Fabi Gloria MRN: [id number]  SSN: [id number]    YOB: 1975  Age: 38 y.o. Sex: female      Admit Date: 3/23/2020    LOS: 3 days     Subjective:     Patient with depression and anxiety. Admitted due to abdominal pain. Found to have gallstones, fatty liver and pancreatitis and possible dilated CBD. She had ERCP on 3-. Found to have CBD stone and sphincterotomy was performed and stones were removed. Surgery was consulted and did cholecystectomy on 3-. Patient is up in chair eating breakfast. She has some abdominal pain. Objective:     Vitals:    03/25/20 1942 03/25/20 2348 03/26/20 0428 03/26/20 0742   BP: 110/72 120/77 109/68 112/71   Pulse: 77 81 79 (!) 103   Resp: 17 18 18 18   Temp: 98.4 °F (36.9 °C) 98 °F (36.7 °C) 98 °F (36.7 °C) 98.4 °F (36.9 °C)   SpO2: 96% 97% 96% 95%   Weight:   80.9 kg (178 lb 6.4 oz)    Height:            Intake and Output:  Current Shift: No intake/output data recorded. Last three shifts: 03/24 1901 - 03/26 0700  In: 1500 [I.V.:1500]  Out: 1 [Urine:900; Drains:20]    Physical Exam:     General:                    The patient is a pleasant female in no acute distress. she is sitting up in chair, eating. Head:                                   Normocephalic/atraumatic. Eyes:                                   No palpebral pallor or scleral icterus. ENT:                                    External auricular and nasal exam within normal limits. Mucous membranes are moist.  Neck:                                   Supple, non-tender, no JVD. Lungs:                       Clear to auscultation bilaterally without wheezes or crackles. No respiratory distress or accessory muscle use. Heart:                                  Regular rate and rhythm, without murmurs, rubs, or gallops.   Abdomen:                  Soft, non-tender, mildly distended with normoactive bowel sounds. Surgical drain in place. Genitourinary:           No tenderness over the bladder or bilateral CVAs. Extremities:               Without clubbing, cyanosis, or edema. Skin:                                    Normal color, texture, and turgor. No rashes, lesions, or jaundice. Pulses:                      Radial and dorsalis pedis pulses present 2+ bilaterally. Capillary refill <2s. Neurologic:                CN II-XII grossly intact and symmetrical.                                               Moving all four extremities well with no focal deficits. Psychiatric:                Pleasant demeanor, appropriate affect. Alert and oriented x 3      Lab/Data Review:    Recent Results (from the past 24 hour(s))   CBC W/O DIFF    Collection Time: 03/26/20  4:23 AM   Result Value Ref Range    WBC 20.1 (H) 4.3 - 11.1 K/uL    RBC 4.04 (L) 4.05 - 5.2 M/uL    HGB 11.7 11.7 - 15.4 g/dL    HCT 36.4 35.8 - 46.3 %    MCV 90.1 79.6 - 97.8 FL    MCH 29.0 26.1 - 32.9 PG    MCHC 32.1 31.4 - 35.0 g/dL    RDW 12.0 11.9 - 14.6 %    PLATELET 998 480 - 349 K/uL    MPV 9.3 (L) 9.4 - 12.3 FL    ABSOLUTE NRBC 0.00 0.0 - 0.2 K/uL   METABOLIC PANEL, COMPREHENSIVE    Collection Time: 03/26/20  4:23 AM   Result Value Ref Range    Sodium 140 136 - 145 mmol/L    Potassium 3.4 (L) 3.5 - 5.1 mmol/L    Chloride 106 98 - 107 mmol/L    CO2 27 21 - 32 mmol/L    Anion gap 7 7 - 16 mmol/L    Glucose 113 (H) 65 - 100 mg/dL    BUN 6 6 - 23 MG/DL    Creatinine 0.75 0.6 - 1.0 MG/DL    GFR est AA >60 >60 ml/min/1.73m2    GFR est non-AA >60 >60 ml/min/1.73m2    Calcium 8.7 8.3 - 10.4 MG/DL    Bilirubin, total 0.7 0.2 - 1.1 MG/DL    ALT (SGPT) 481 (H) 12 - 65 U/L    AST (SGOT) 169 (H) 15 - 37 U/L    Alk.  phosphatase 221 (H) 50 - 136 U/L    Protein, total 6.9 6.3 - 8.2 g/dL    Albumin 3.1 (L) 3.5 - 5.0 g/dL    Globulin 3.8 (H) 2.3 - 3.5 g/dL    A-G Ratio 0.8 (L) 1.2 - 3.5       CT abdomen and pelvis   3-  IMPRESSION:   1. Pancreatitis with no evidence of associated mass or abscess. 2. Gallstones, borderline common bile duct dilatation. 3. Wall thickening of majority of large bowel is most likely underdistention  artifact (although low-grade colitis could have a similar appearance). 4. Small hiatal hernia.     ERCP   3-  IMPRESSION: Choledocholithiasis.       Current Facility-Administered Medications:     dextrose 5% - 0.45% NaCl with KCl 20 mEq/L infusion, 100 mL/hr, IntraVENous, CONTINUOUS, Elizabeth Ramirez MD, Last Rate: 100 mL/hr at 03/26/20 0828, 100 mL/hr at 03/26/20 0828    morphine 10 mg/ml injection 2-6 mg, 2-6 mg, IntraVENous, Q1H PRN, Seven Kwon MD    acetaminophen (TYLENOL) tablet 1,000 mg, 1,000 mg, Oral, Q6H PRN, Seven Kwon MD, 1,000 mg at 03/26/20 6393    oxyCODONE IR (ROXICODONE) tablet 5-10 mg, 5-10 mg, Oral, Q4H PRN, Seven Kwon MD, 5 mg at 03/26/20 0455    piperacillin-tazobactam (ZOSYN) 4.5 g in 0.9% sodium chloride (MBP/ADV) 100 mL, 4.5 g, IntraVENous, Q8H, Elizabeth Ramirez MD, Last Rate: 25 mL/hr at 03/26/20 0218, 4.5 g at 03/26/20 0218    lip protectant (BLISTEX) ointment 1 Each, 1 Each, Topical, PRN, Taj Alfaro MD    sodium chloride (NS) flush 5-40 mL, 5-40 mL, IntraVENous, Q8H, Miguel Ramirez MD, 10 mL at 03/26/20 0630    sodium chloride (NS) flush 5-40 mL, 5-40 mL, IntraVENous, PRN, Seven Kwon MD    ondansetron (ZOFRAN) injection 4 mg, 4 mg, IntraVENous, Q4H PRN, Elizabeth Escalante MD    senna-docusate (PERICOLACE) 8.6-50 mg per tablet 1 Tab, 1 Tab, Oral, DAILY, Ashley, Elizabeth Morgan MD, 1 Tab at 03/26/20 0827    LORazepam (ATIVAN) injection 1 mg, 1 mg, IntraVENous, BID PRN, Seven Kwon MD, 1 mg at 03/23/20 2618    pantoprazole (PROTONIX) tablet 40 mg, 40 mg, Oral, ACB, Seven Kwon MD, 40 mg at 03/26/20 1835      Assessment:     Principal Problem: Acute gallstone pancreatitis (3/23/2020)      Overview: 3/25/20 s/p lap katie, liver biopsy and drain placement; Dr Iram Roldan    Active Problems:    Anxiety (1/2/2018)      Chronic calculous cholecystitis (2/6/2020)      Abnormal liver US (at [address]) (2/6/2020)      Elevated liver enzymes (2/6/2020)      Hyperbilirubinemia (3/24/2020)      Choledocholithiasis (3/24/2020)      Hypokalemia (3/25/2020)      Acute pancreatitis (3/25/2020)    hypokalemia     Plan:     Acute pancreatitis   Gallstones and CBD stones. S/P ERCP and CBD stones removal on 3-   S/P cholecystectomy on 3-  IV fluid,   Clear liquid diet   Symptomatic control. Hypokalemia   Will replace. I have discussed the plan of care with patient.       DVT prophylaxis : SCD     Signed By: Brayden Moore MD     March 26, 2020

## 2020-03-26 NOTE — PROGRESS NOTES
Hourly rounds completed, pt denies needs at this time. Pt ambulating int he halls today. Belly distended, no flatus. NEMESIO with little output. Pt shown and demonstrated dressing change and how to empty bulb. Tylenol given for pain instead of narcotics. Observing for ileus, no nausea and tolerating PO liquids.

## 2020-03-27 VITALS
RESPIRATION RATE: 17 BRPM | BODY MASS INDEX: 30.89 KG/M2 | SYSTOLIC BLOOD PRESSURE: 122 MMHG | HEART RATE: 80 BPM | WEIGHT: 185.4 LBS | OXYGEN SATURATION: 98 % | HEIGHT: 65 IN | DIASTOLIC BLOOD PRESSURE: 76 MMHG | TEMPERATURE: 98.5 F

## 2020-03-27 LAB
ALBUMIN SERPL-MCNC: 2.6 G/DL (ref 3.5–5)
ALBUMIN/GLOB SERPL: 0.7 {RATIO} (ref 1.2–3.5)
ALP SERPL-CCNC: 162 U/L (ref 50–136)
ALT SERPL-CCNC: 290 U/L (ref 12–65)
ANION GAP SERPL CALC-SCNC: 4 MMOL/L (ref 7–16)
AST SERPL-CCNC: 72 U/L (ref 15–37)
BILIRUB SERPL-MCNC: 0.6 MG/DL (ref 0.2–1.1)
BUN SERPL-MCNC: 4 MG/DL (ref 6–23)
CALCIUM SERPL-MCNC: 8.3 MG/DL (ref 8.3–10.4)
CHLORIDE SERPL-SCNC: 109 MMOL/L (ref 98–107)
CO2 SERPL-SCNC: 28 MMOL/L (ref 21–32)
CREAT SERPL-MCNC: 0.73 MG/DL (ref 0.6–1)
ERYTHROCYTE [DISTWIDTH] IN BLOOD BY AUTOMATED COUNT: 12.3 % (ref 11.9–14.6)
GLOBULIN SER CALC-MCNC: 3.5 G/DL (ref 2.3–3.5)
GLUCOSE SERPL-MCNC: 110 MG/DL (ref 65–100)
HCT VFR BLD AUTO: 30.4 % (ref 35.8–46.3)
HGB BLD-MCNC: 9.9 G/DL (ref 11.7–15.4)
MCH RBC QN AUTO: 29.4 PG (ref 26.1–32.9)
MCHC RBC AUTO-ENTMCNC: 32.6 G/DL (ref 31.4–35)
MCV RBC AUTO: 90.2 FL (ref 79.6–97.8)
NRBC # BLD: 0 K/UL (ref 0–0.2)
PLATELET # BLD AUTO: 339 K/UL (ref 150–450)
PMV BLD AUTO: 9.4 FL (ref 9.4–12.3)
POTASSIUM SERPL-SCNC: 3.1 MMOL/L (ref 3.5–5.1)
PROT SERPL-MCNC: 6.1 G/DL (ref 6.3–8.2)
RBC # BLD AUTO: 3.37 M/UL (ref 4.05–5.2)
SODIUM SERPL-SCNC: 141 MMOL/L (ref 136–145)
WBC # BLD AUTO: 9.8 K/UL (ref 4.3–11.1)

## 2020-03-27 PROCEDURE — 74011250637 HC RX REV CODE- 250/637: Performed by: SURGERY

## 2020-03-27 PROCEDURE — 74011250637 HC RX REV CODE- 250/637: Performed by: INTERNAL MEDICINE

## 2020-03-27 PROCEDURE — 85027 COMPLETE CBC AUTOMATED: CPT

## 2020-03-27 PROCEDURE — 74011000258 HC RX REV CODE- 258: Performed by: SURGERY

## 2020-03-27 PROCEDURE — 36415 COLL VENOUS BLD VENIPUNCTURE: CPT

## 2020-03-27 PROCEDURE — 74011250636 HC RX REV CODE- 250/636: Performed by: SURGERY

## 2020-03-27 PROCEDURE — 80053 COMPREHEN METABOLIC PANEL: CPT

## 2020-03-27 RX ORDER — POTASSIUM CHLORIDE 20 MEQ/1
40 TABLET, EXTENDED RELEASE ORAL
Status: COMPLETED | OUTPATIENT
Start: 2020-03-27 | End: 2020-03-27

## 2020-03-27 RX ORDER — PANTOPRAZOLE SODIUM 40 MG/1
40 TABLET, DELAYED RELEASE ORAL
Qty: 15 TAB | Refills: 0 | Status: SHIPPED | OUTPATIENT
Start: 2020-03-28 | End: 2020-04-12

## 2020-03-27 RX ADMIN — PIPERACILLIN AND TAZOBACTAM 4.5 G: 4; .5 INJECTION, POWDER, FOR SOLUTION INTRAVENOUS at 09:00

## 2020-03-27 RX ADMIN — PANTOPRAZOLE SODIUM 40 MG: 40 TABLET, DELAYED RELEASE ORAL at 05:38

## 2020-03-27 RX ADMIN — SENNOSIDES AND DOCUSATE SODIUM 1 TABLET: 8.6; 5 TABLET ORAL at 08:58

## 2020-03-27 RX ADMIN — DEXTROSE MONOHYDRATE, SODIUM CHLORIDE, AND POTASSIUM CHLORIDE 100 ML/HR: 50; 4.5; 1.49 INJECTION, SOLUTION INTRAVENOUS at 09:01

## 2020-03-27 RX ADMIN — Medication 10 ML: at 05:43

## 2020-03-27 RX ADMIN — PIPERACILLIN AND TAZOBACTAM 4.5 G: 4; .5 INJECTION, POWDER, FOR SOLUTION INTRAVENOUS at 01:45

## 2020-03-27 RX ADMIN — POTASSIUM CHLORIDE 40 MEQ: 20 TABLET, EXTENDED RELEASE ORAL at 09:54

## 2020-03-27 NOTE — PROGRESS NOTES
Discharge instructions reviewed with patient. IV removed. Explained dressing changes and drain care. Encouraged pt not to drive but states she drove here and will drive home. Pt walked to her car.

## 2020-03-27 NOTE — PROGRESS NOTES
SBAR report given to oncoming nurse. Respirations are even and unlabored. Bed is low, locked and call light within reach.

## 2020-03-27 NOTE — PROGRESS NOTES
CM screened chart for discharge planning. Patient to discharge home this day with no needs voiced at this time. Please consult or notify CM if any needs shall arise. Care Management Interventions  PCP Verified by CM: Yes  Mode of Transport at Discharge: Self  Transition of Care Consult (CM Consult): Discharge Planning  Discharge Durable Medical Equipment: No  Physical Therapy Consult: No  Occupational Therapy Consult: No  Speech Therapy Consult: No  Current Support Network: Own Home(Per patient, her father lives with her in a one level home. The patient is the primary caregiver for her father. )  Confirm Follow Up Transport: Self  The Plan for Transition of Care is Related to the Following Treatment Goals : Patient to obtain care to become medically stable and to return home with a safe transition.    The Patient and/or Patient Representative was Provided with a Choice of Provider and Agrees with the Discharge Plan?: Yes  Name of the Patient Representative Who was Provided with a Choice of Provider and Agrees with the Discharge Plan: [facility]   [place] of Choice List was Provided with Basic Dialogue that Supports the Patient's Individualized Plan of Care/Goals, Treatment Preferences and Shares the Quality Data Associated with the Providers?: Yes  [place] Resource Information Provided?: No  Discharge Location  Discharge Placement: Home

## 2020-03-27 NOTE — DISCHARGE SUMMARY
Discharge Summary     Patient: Chriss Sandhoff MRN: [id number]  SSN: [id number]    YOB: 1975  Age: 38 y.o. Sex: female       Admit Date: 3/23/2020    Discharge Date: 3/27/2020      Admission Diagnoses: Acute gallstone pancreatitis [K85.10]    Discharge Diagnoses:   Problem List as of 3/27/2020 Date Reviewed: 2/6/2020          Codes Class Noted - Resolved    Hypokalemia ICD-10-CM: E87.6  ICD-9-CM: 276.8  3/25/2020 - Present        Acute pancreatitis ICD-10-CM: K85.90  ICD-9-CM: 577.0  3/25/2020 - Present        Hyperbilirubinemia ICD-10-CM: E80.6  ICD-9-CM: 782.4  3/24/2020 - Present        Choledocholithiasis ICD-10-CM: K80.50  ICD-9-CM: 574.50  3/24/2020 - Present        * (Principal) Acute gallstone pancreatitis ICD-10-CM: K85.10  ICD-9-CM: 725.0, 574.20  3/23/2020 - Present    Overview Addendum 3/25/2020  3:47 PM by Awilda Henderson MD     3/25/20 s/p lap katie, liver biopsy and drain placement; Dr Dayday Nunn             Chronic calculous cholecystitis ICD-10-CM: K80.10  ICD-9-CM: 574.10  2/6/2020 - Present        RUQ pain ICD-10-CM: R10.11  ICD-9-CM: 789.01  2/6/2020 - Present        Abnormal liver US (at [address]) ICD-10-CM: R93.2  ICD-9-CM: 793.3  2/6/2020 - Present        Elevated liver enzymes ICD-10-CM: R74.8  ICD-9-CM: 790.5  2/6/2020 - Present        Recurrent depression ([address] ICD-10-CM: F33.9  ICD-9-CM: 296.30  1/2/2018 - Present        Anxiety ICD-10-CM: F41.9  ICD-9-CM: 300.00  1/2/2018 - Present        RESOLVED: Sore throat ICD-10-CM: J02.9  ICD-9-CM: 059  3/28/2016 - 1/2/2018        RESOLVED: Acute cervical adenitis ICD-10-CM: L04.0  ICD-9-CM: 447  3/28/2016 - 1/2/2018               Discharge Condition: Stable    Hospital Course:     Patient with depression and anxiety.      Admitted due to abdominal pain.      Found to have gallstones, fatty liver and pancreatitis and dilated CBD.      She had ERCP on 3-.  Found to have CBD stone and sphincterotomy was performed and stones were removed.      Surgery was consulted and did cholecystectomy on 3-.      Patient is improved. She is eating fine today and would like to go home. Dr. Annalisa Whitt approved for her to be discharge with a drain from surgical site and he will follow up with her. Physical Exam:      General:                    The patient is a pleasant female in no acute distress.  She is sitting up in chair, eating. EQZJ:                                   GYSBELZOBCTIF/IJAGGASTWC. Eyes:                                   No palpebral pallor or scleral icterus. ENT:                                    External auricular and nasal exam within normal limits.                                             GJDTDJ membranes are moist.  Neck:                                   Supple, non-tender, no JVD. Lungs:                       Clear to auscultation bilaterally without wheezes or crackles.                                             No respiratory distress or accessory muscle use. Heart:                                  Regular rate and rhythm, without murmurs, rubs, or gallops. Abdomen:                  Soft, non-tender, mildly distended with normoactive bowel sounds. Surgical drain in place. Genitourinary:           No tenderness over the bladder or bilateral CVAs. Extremities:               Without clubbing, cyanosis, or edema. Skin:                                    Normal color, texture, and turgor. No rashes, lesions, or jaundice. Pulses:                      Radial and dorsalis pedis pulses present 2+ bilaterally.                                               Capillary refill <2s. Neurologic:                CN II-XII grossly intact and symmetrical.                                               Moving all four extremities well with no focal deficits. Psychiatric:                Pleasant demeanor, appropriate affect.  Alert and oriented x 3       Consults: Gastroenterology and General Surgery    Significant Diagnostic Studies:   CT abdomen and pelvis   3-  IMPRESSION:   1. Pancreatitis with no evidence of associated mass or abscess. 2. Gallstones, borderline common bile duct dilatation. 3. Wall thickening of majority of large bowel is most likely underdistention  artifact (although low-grade colitis could have a similar appearance). 4. Small hiatal hernia.     ERCP   3-  IMPRESSION: Choledocholithiasis.     Recent Results (from the past 24 hour(s))   METABOLIC PANEL, COMPREHENSIVE    Collection Time: 03/27/20  5:16 AM   Result Value Ref Range    Sodium 141 136 - 145 mmol/L    Potassium 3.1 (L) 3.5 - 5.1 mmol/L    Chloride 109 (H) 98 - 107 mmol/L    CO2 28 21 - 32 mmol/L    Anion gap 4 (L) 7 - 16 mmol/L    Glucose 110 (H) 65 - 100 mg/dL    BUN 4 (L) 6 - 23 MG/DL    Creatinine 0.73 0.6 - 1.0 MG/DL    GFR est AA >60 >60 ml/min/1.73m2    GFR est non-AA >60 >60 ml/min/1.73m2    Calcium 8.3 8.3 - 10.4 MG/DL    Bilirubin, total 0.6 0.2 - 1.1 MG/DL    ALT (SGPT) 290 (H) 12 - 65 U/L    AST (SGOT) 72 (H) 15 - 37 U/L    Alk. phosphatase 162 (H) 50 - 136 U/L    Protein, total 6.1 (L) 6.3 - 8.2 g/dL    Albumin 2.6 (L) 3.5 - 5.0 g/dL    Globulin 3.5 2.3 - 3.5 g/dL    A-G Ratio 0.7 (L) 1.2 - 3.5     CBC W/O DIFF    Collection Time: 03/27/20  5:16 AM   Result Value Ref Range    WBC 9.8 4.3 - 11.1 K/uL    RBC 3.37 (L) 4.05 - 5.2 M/uL    HGB 9.9 (L) 11.7 - 15.4 g/dL    HCT 30.4 (L) 35.8 - 46.3 %    MCV 90.2 79.6 - 97.8 FL    MCH 29.4 26.1 - 32.9 PG    MCHC 32.6 31.4 - 35.0 g/dL    RDW 12.3 11.9 - 14.6 %    PLATELET 778 391 - 507 K/uL    MPV 9.4 9.4 - 12.3 FL    ABSOLUTE NRBC 0.00 0.0 - 0.2 K/uL           Disposition: home    Discharge Medications:   Current Discharge Medication List      START taking these medications    Details   pantoprazole (PROTONIX) 40 mg tablet Take 1 Tab by mouth Daily (before breakfast) for 15 days.   Qty: 15 Tab, Refills: 0         CONTINUE these medications which have NOT CHANGED    Details FLUoxetine (PROZAC) 10 mg capsule Take  by mouth daily. mirtazapine (REMERON) 30 mg tablet Take  by mouth nightly. atorvastatin (LIPITOR) 40 mg tablet Take  by mouth daily. zolpidem (AMBIEN) 10 mg tablet Take 1 Tab by mouth nightly as needed for Sleep. Max Daily Amount: 10 mg.  Qty: 30 Tab, Refills: 2    Associated Diagnoses: Insomnia, unspecified type      LORazepam (ATIVAN) 1 mg tablet Take 1 Tab by mouth two (2) times daily as needed for Anxiety. Max Daily Amount: 2 mg. Qty: 60 Tab, Refills: 1    Associated Diagnoses: Anxiety             Activity: Activity as tolerated  Diet: soft diet and advance as tolerated. Wound Care: Keep wound clean and dry    Follow-up Appointments   Procedures    FOLLOW UP VISIT Appointment in: Other (Specify) See your primary doctor in 3-5 days. See Dr. Sharyle Goldstein in follow up in 1 week or as per his advise. See your primary doctor in 3-5 days. See Dr. Sharyle Goldstein in follow up in 1 week or as per his advise. Standing Status:   Standing     Number of Occurrences:   1     Order Specific Question:   Appointment in     Answer: Other (Specify)     I have discussed the plan of care with patient. Time spent on discharge is 37 minutes.       Signed By: Anu Mcintosh MD     March 27, 2020

## 2020-03-27 NOTE — DISCHARGE INSTRUCTIONS
Patient Education        Learning About Cholecystectomy  What is cholecystectomy  Cholecystectomy (say \"koh-prasanth-sis-APARNA-tuh-beau\") is surgery to remove the gallbladder and gallstones. The gallbladder stores bile made by your liver. The bile helps you digest fats. Gallstones are made of cholesterol and other things found in bile. Your body will work fine without a gallbladder. Bile will go straight from the liver to the intestine. There may be small changes in how you digest food. But you probably will not notice them. How is the surgery done? Cholecystectomy is usually laparoscopic surgery. To do this type of surgery, a doctor puts a lighted tube, or scope, and other surgical tools through small cuts (incisions) in your belly. The doctor is able to see your organs with the scope. After your gallbladder is removed, you will no longer have gallstones. The cuts leave scars that usually fade with time. Open surgery may be done if problems are found during laparoscopic surgery. With open surgery, the gallbladder is removed through one larger cut in your belly. And the hospital stay is longer. What can you expect after surgery? It is normal to feel weak and tired for several days after you return home. Your belly may be swollen. If you had laparoscopic surgery, you may also have pain in your shoulder for about 24 hours. You may have gas or need to burp a lot at first.  A few people get diarrhea. The diarrhea usually goes away in 2 to 4 weeks. But it may last longer. How quickly you get better depends on which kind of surgery you had. For laparoscopic surgery, most people can go back to work or their normal routine in 1 to 2 weeks. It depends on the type of work you do and how you feel. If you have open surgery, it will probably take 4 to 6 weeks before you get back to your normal routine. Follow-up care is a key part of your treatment and safety.  Be sure to make and go to all appointments, and call your doctor if you are having problems. It's also a good idea to know your test results and keep a list of the medicines you take. Where can you learn more? Go to http://kristina-duane.info/  Enter C034 in the search box to learn more about \"Learning About Cholecystectomy. \"  Current as of: August 11, 2019Content Version: 12.4  © 9131-9768 Healthwise, [facility]. Care instructions adapted under license by [employer] (which disclaims liability or warranty for this information). If you have questions about a medical condition or this instruction, always ask your healthcare professional. [place] [address] any warranty or liability for your use of this information. Empty the drain as often as needed and keep it suctioning (compressed) at all times  Change the dry gauze around the drain exit site as needed  Keep water off the drain exit site so it doesn't get infected. Leave the other dressings alone    No heavy lifting (>5lbs) for 6 weeks to reduce risk of developing a hernia in the incisions. No driving until you are off pain meds for 24hrs and have no pain with movements associated with driving. Pain prescription (Norco) on chart for patient to use as needed for pain    Follow-up with Dr Apurva Carpio nurse practitioner Danie Tim NP or Lico Garcia NP or the [facility] Surgical surgeon covering the office) in 11 days on a Monday to have drain and skin clips removed. Then see Dr Jesus Bar as needed after that visit.   [address], [address]  (Call for an appt time unless one is already made for you by discharge nurse which is preferred -->[phone]-->option 1)

## 2020-03-27 NOTE — PROGRESS NOTES
Patient is resting in bed. Assessment completed. Respirations are even and unlabored. Continuous  IV infusing. NEMESIO drain is intact. No distress at this time. Bed is low,locked and call light within reach.

## 2020-03-27 NOTE — PROGRESS NOTES
Progress Note    Patient: Rodirgo Natarajan MRN: [id number]  SSN: [id number]    YOB: 1975  Age: 38 y.o. Sex: female      Admit Date: 3/23/2020    LOS: 4 days     Subjective:     Patient with depression and anxiety. Admitted due to abdominal pain. Found to have gallstones, fatty liver and pancreatitis and possible dilated CBD. She had ERCP on 3-. Found to have CBD stone and sphincterotomy was performed and stones were removed. Surgery was consulted and did cholecystectomy on 3-. Patient is up in chair eating breakfast. She has no abdominal pain now. She would like to have diet advanced. Objective:     Vitals:    03/26/20 1919 03/26/20 2350 03/27/20 0445 03/27/20 0751   BP: 104/67 117/81 103/66 96/64   Pulse: 72 78 70 72   Resp: 18 18 18 18   Temp: 98.3 °F (36.8 °C) 98 °F (36.7 °C) 98.3 °F (36.8 °C) 98.1 °F (36.7 °C)   SpO2: 97% 96% 95% 95%   Weight:   84.1 kg (185 lb 6.4 oz)    Height:            Intake and Output:  Current Shift: No intake/output data recorded. Last three shifts: 03/25 1901 - 03/27 0700  In: -   Out: [facility] [Urine:1500; Drains:35]    Physical Exam:     General:                    The patient is a pleasant female in no acute distress. She is sitting up in chair, eating. Head:                                   Normocephalic/atraumatic. Eyes:                                   No palpebral pallor or scleral icterus. ENT:                                    External auricular and nasal exam within normal limits. Mucous membranes are moist.  Neck:                                   Supple, non-tender, no JVD. Lungs:                       Clear to auscultation bilaterally without wheezes or crackles. No respiratory distress or accessory muscle use.   Heart:                                  Regular rate and rhythm, without murmurs, rubs, or gallops. Abdomen:                  Soft, non-tender, mildly distended with normoactive bowel sounds. Surgical drain in place. Genitourinary:           No tenderness over the bladder or bilateral CVAs. Extremities:               Without clubbing, cyanosis, or edema. Skin:                                    Normal color, texture, and turgor. No rashes, lesions, or jaundice. Pulses:                      Radial and dorsalis pedis pulses present 2+ bilaterally. Capillary refill <2s. Neurologic:                CN II-XII grossly intact and symmetrical.                                               Moving all four extremities well with no focal deficits. Psychiatric:                Pleasant demeanor, appropriate affect. Alert and oriented x 3      Lab/Data Review:    Recent Results (from the past 24 hour(s))   METABOLIC PANEL, COMPREHENSIVE    Collection Time: 03/27/20  5:16 AM   Result Value Ref Range    Sodium 141 136 - 145 mmol/L    Potassium 3.1 (L) 3.5 - 5.1 mmol/L    Chloride 109 (H) 98 - 107 mmol/L    CO2 28 21 - 32 mmol/L    Anion gap 4 (L) 7 - 16 mmol/L    Glucose 110 (H) 65 - 100 mg/dL    BUN 4 (L) 6 - 23 MG/DL    Creatinine 0.73 0.6 - 1.0 MG/DL    GFR est AA >60 >60 ml/min/1.73m2    GFR est non-AA >60 >60 ml/min/1.73m2    Calcium 8.3 8.3 - 10.4 MG/DL    Bilirubin, total 0.6 0.2 - 1.1 MG/DL    ALT (SGPT) 290 (H) 12 - 65 U/L    AST (SGOT) 72 (H) 15 - 37 U/L    Alk.  phosphatase 162 (H) 50 - 136 U/L    Protein, total 6.1 (L) 6.3 - 8.2 g/dL    Albumin 2.6 (L) 3.5 - 5.0 g/dL    Globulin 3.5 2.3 - 3.5 g/dL    A-G Ratio 0.7 (L) 1.2 - 3.5     CBC W/O DIFF    Collection Time: 03/27/20  5:16 AM   Result Value Ref Range    WBC 9.8 4.3 - 11.1 K/uL    RBC 3.37 (L) 4.05 - 5.2 M/uL    HGB 9.9 (L) 11.7 - 15.4 g/dL    HCT 30.4 (L) 35.8 - 46.3 %    MCV 90.2 79.6 - 97.8 FL    MCH 29.4 26.1 - 32.9 PG    MCHC 32.6 31.4 - 35.0 g/dL    RDW 12.3 11.9 - 14.6 %    PLATELET 492 887 - 450 K/uL    MPV 9.4 9.4 - 12.3 FL    ABSOLUTE NRBC 0.00 0.0 - 0.2 K/uL     CT abdomen and pelvis   3-  IMPRESSION:   1. Pancreatitis with no evidence of associated mass or abscess. 2. Gallstones, borderline common bile duct dilatation. 3. Wall thickening of majority of large bowel is most likely underdistention  artifact (although low-grade colitis could have a similar appearance). 4. Small hiatal hernia.     ERCP   3-  IMPRESSION: Choledocholithiasis.       Current Facility-Administered Medications:     dextrose 5% - 0.45% NaCl with KCl 20 mEq/L infusion, 100 mL/hr, IntraVENous, CONTINUOUS, Connie Ramirez MD, Last Rate: 100 mL/hr at 03/27/20 0901, 100 mL/hr at 03/27/20 0901    morphine 10 mg/ml injection 2-6 mg, 2-6 mg, IntraVENous, Q1H PRN, Mike Manzo MD, 3 mg at 03/26/20 1912    acetaminophen (TYLENOL) tablet 1,000 mg, 1,000 mg, Oral, Q6H PRN, Mike Manzo MD, 1,000 mg at 03/26/20 1437    oxyCODONE IR (ROXICODONE) tablet 5-10 mg, 5-10 mg, Oral, Q4H PRN, Mike Manzo MD, 10 mg at 03/26/20 2158    piperacillin-tazobactam (ZOSYN) 4.5 g in 0.9% sodium chloride (MBP/ADV) 100 mL, 4.5 g, IntraVENous, Q8H, Miguel Ramirez MD, Last Rate: 25 mL/hr at 03/27/20 0900, 4.5 g at 03/27/20 0900    lip protectant (BLISTEX) ointment 1 Each, 1 Each, Topical, PRN, Taj Alfaro MD    sodium chloride (NS) flush 5-40 mL, 5-40 mL, IntraVENous, Q8H, Miguel Ramirez MD, 10 mL at 03/27/20 0543    sodium chloride (NS) flush 5-40 mL, 5-40 mL, IntraVENous, PRN, Mike Manzo MD    ondansetron (ZOFRAN) injection 4 mg, 4 mg, IntraVENous, Q4H PRN, Connie Calvin MD    senna-docusate (PERICOLACE) 8.6-50 mg per tablet 1 Tab, 1 Tab, Oral, DAILY, Connie Ramirez MD, 1 Tab at 03/27/20 0858    LORazepam (ATIVAN) injection 1 mg, 1 mg, IntraVENous, BID PRN, Mike Manzo MD, 1 mg at 03/23/20 5710    pantoprazole (PROTONIX) tablet 40 mg, 40 mg, Oral, ACB, Connie Ramirez, MD, 40 mg at 03/27/20 0538      Assessment:     Principal Problem:    Acute gallstone pancreatitis (3/23/2020)      Overview: 3/25/20 s/p lap katie, liver biopsy and drain placement; Dr Dayron Yancey    Active Problems:    Anxiety (1/2/2018)      Chronic calculous cholecystitis (2/6/2020)      Abnormal liver US (at [address]) (2/6/2020)      Elevated liver enzymes (2/6/2020)      Hyperbilirubinemia (3/24/2020)      Choledocholithiasis (3/24/2020)      Hypokalemia (3/25/2020)      Acute pancreatitis (3/25/2020)    hypokalemia     Plan:     Acute pancreatitis   Gallstones and CBD stones. S/P ERCP and CBD stones removal on 3-   S/P cholecystectomy on 3-  IV fluid,   Will advance diet. Symptomatic control. Hypokalemia   Will replace. I have discussed the plan of care with patient.       DVT prophylaxis : SCD     Signed By: Mando Narvaez MD     March 27, 2020

## 2020-04-06 NOTE — ANESTHESIA POSTPROCEDURE EVALUATION
Procedure(s):  CHOLECYSTECTOMY LAPAROSCOPIC/ LIVER BIOPSY/ ROOM 627.     general    Anesthesia Post Evaluation      Multimodal analgesia: multimodal analgesia used between 6 hours prior to anesthesia start to PACU discharge  Patient location during evaluation: PACU  Patient participation: complete - patient participated  Level of consciousness: awake  Pain management: adequate  Airway patency: patent  Anesthetic complications: no  Cardiovascular status: acceptable  Respiratory status: acceptable  Hydration status: acceptable        Vitals Value Taken Time   /75 3/25/2020  3:43 PM   Temp 37.2 °C (98.9 °F) 3/25/2020  3:48 PM   Pulse 71 3/25/2020  3:53 PM   Resp 16 3/25/2020  3:48 PM   SpO2 97 % 3/25/2020  3:53 PM

## 2020-09-09 ENCOUNTER — APPOINTMENT (RX ONLY)
Dept: URBAN - METROPOLITAN AREA CLINIC 23 | Facility: CLINIC | Age: 45
Setting detail: DERMATOLOGY
End: 2020-09-09

## 2020-09-09 DIAGNOSIS — B02.9 ZOSTER WITHOUT COMPLICATIONS: ICD-10-CM | Status: RESOLVING

## 2020-09-09 DIAGNOSIS — L30.9 DERMATITIS, UNSPECIFIED: ICD-10-CM

## 2020-09-09 PROBLEM — L70.0 ACNE VULGARIS: Status: ACTIVE | Noted: 2020-09-09

## 2020-09-09 PROCEDURE — ? COUNSELING

## 2020-09-09 PROCEDURE — ? OTHER

## 2020-09-09 PROCEDURE — ? TREATMENT REGIMEN

## 2020-09-09 PROCEDURE — 99203 OFFICE O/P NEW LOW 30 MIN: CPT

## 2020-09-09 PROCEDURE — ? PRESCRIPTION

## 2020-09-09 RX ORDER — IVERMECTIN 3 MG/1
TABLET ORAL
Qty: 4 | Refills: 0 | Status: ERX | COMMUNITY
Start: 2020-09-09

## 2020-09-09 RX ADMIN — IVERMECTIN: 3 TABLET ORAL at 00:00

## 2020-09-09 ASSESSMENT — LOCATION SIMPLE DESCRIPTION DERM
LOCATION SIMPLE: RIGHT UPPER BACK
LOCATION SIMPLE: RIGHT LOWER BACK
LOCATION SIMPLE: LEFT FOREARM

## 2020-09-09 ASSESSMENT — LOCATION DETAILED DESCRIPTION DERM
LOCATION DETAILED: LEFT DISTAL DORSAL FOREARM
LOCATION DETAILED: RIGHT INFERIOR MEDIAL MIDBACK
LOCATION DETAILED: RIGHT MEDIAL UPPER BACK

## 2020-09-09 ASSESSMENT — LOCATION ZONE DERM
LOCATION ZONE: TRUNK
LOCATION ZONE: ARM

## 2020-09-09 NOTE — PROCEDURE: TREATMENT REGIMEN
Otc Regimen: Cleanse with Cetaphil lotion cleanser and use CeraVe anti-itch lotion as needed to itching
Detail Level: Detailed

## 2020-09-09 NOTE — PROCEDURE: OTHER
Detail Level: Simple
Note Text (......Xxx Chief Complaint.): This diagnosis correlates with the
Other (Free Text): Refer to allergist for patch testing

## 2022-03-18 PROBLEM — K85.10 ACUTE GALLSTONE PANCREATITIS: Status: ACTIVE | Noted: 2020-03-23

## 2022-03-18 PROBLEM — R74.8 ELEVATED LIVER ENZYMES: Status: ACTIVE | Noted: 2020-02-06

## 2022-03-18 PROBLEM — E87.6 HYPOKALEMIA: Status: ACTIVE | Noted: 2020-03-25

## 2022-03-18 PROBLEM — K80.50 CHOLEDOCHOLITHIASIS: Status: ACTIVE | Noted: 2020-03-24

## 2022-03-19 PROBLEM — F41.9 ANXIETY: Status: ACTIVE | Noted: 2018-01-02

## 2022-03-19 PROBLEM — K85.90 ACUTE PANCREATITIS: Status: ACTIVE | Noted: 2020-03-25

## 2022-03-19 PROBLEM — E80.6 HYPERBILIRUBINEMIA: Status: ACTIVE | Noted: 2020-03-24

## 2022-03-19 PROBLEM — F33.9 RECURRENT DEPRESSION (HCC): Status: ACTIVE | Noted: 2018-01-02

## 2022-03-19 PROBLEM — R10.11 RUQ PAIN: Status: ACTIVE | Noted: 2020-02-06

## 2022-03-20 PROBLEM — K80.10 CHRONIC CALCULOUS CHOLECYSTITIS: Status: ACTIVE | Noted: 2020-02-06

## 2022-03-20 PROBLEM — R93.2 ABNORMAL LIVER ULTRASOUND: Status: ACTIVE | Noted: 2020-02-06

## 2022-06-30 ENCOUNTER — OFFICE VISIT (OUTPATIENT)
Dept: INTERNAL MEDICINE CLINIC | Facility: CLINIC | Age: 47
End: 2022-06-30
Payer: COMMERCIAL

## 2022-06-30 VITALS
SYSTOLIC BLOOD PRESSURE: 117 MMHG | WEIGHT: 132.8 LBS | BODY MASS INDEX: 22.13 KG/M2 | HEIGHT: 65 IN | DIASTOLIC BLOOD PRESSURE: 84 MMHG | HEART RATE: 77 BPM | OXYGEN SATURATION: 100 % | TEMPERATURE: 97 F

## 2022-06-30 DIAGNOSIS — I10 ESSENTIAL HYPERTENSION: Primary | ICD-10-CM

## 2022-06-30 DIAGNOSIS — K21.9 GASTROESOPHAGEAL REFLUX DISEASE, UNSPECIFIED WHETHER ESOPHAGITIS PRESENT: ICD-10-CM

## 2022-06-30 DIAGNOSIS — E78.49 OTHER HYPERLIPIDEMIA: ICD-10-CM

## 2022-06-30 DIAGNOSIS — Z12.31 SCREENING MAMMOGRAM FOR HIGH-RISK PATIENT: ICD-10-CM

## 2022-06-30 PROCEDURE — 99204 OFFICE O/P NEW MOD 45 MIN: CPT | Performed by: INTERNAL MEDICINE

## 2022-06-30 RX ORDER — OMEPRAZOLE 20 MG/1
1 CAPSULE, DELAYED RELEASE ORAL DAILY
COMMUNITY
Start: 2022-03-16 | End: 2022-06-30 | Stop reason: SDUPTHER

## 2022-06-30 RX ORDER — BUPROPION HYDROCHLORIDE 150 MG/1
150 TABLET ORAL EVERY MORNING
COMMUNITY
Start: 2022-05-06

## 2022-06-30 RX ORDER — OMEPRAZOLE 20 MG/1
20 CAPSULE, DELAYED RELEASE ORAL DAILY
Qty: 90 CAPSULE | Refills: 1 | Status: SHIPPED | OUTPATIENT
Start: 2022-06-30

## 2022-06-30 RX ORDER — ATORVASTATIN CALCIUM 40 MG/1
40 TABLET, FILM COATED ORAL NIGHTLY
Qty: 90 TABLET | Refills: 1 | Status: SHIPPED | OUTPATIENT
Start: 2022-06-30

## 2022-06-30 ASSESSMENT — ENCOUNTER SYMPTOMS
VOMITING: 0
ABDOMINAL PAIN: 0
SHORTNESS OF BREATH: 0

## 2022-06-30 NOTE — PROGRESS NOTES
Carine Somers M.D. 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 She is also having issues with her eye sight and R shoulder. She states there is some sort of muscle issue with her R shoulder. She would also like to discuss a mammogram and pap smear. History of Present Illness:  Jaci Queen is a 51 y.o. female. HPI      Hyperlipidemia  Patient is in for follow-up for hyperlipidemia. Diet and Lifestyle: generally follows a low fat low cholesterol diet. Risk factors for vascular disease consist of hyperlipidemia. Last LDL was No results found for: LDLCALC, LDLCHOLESTEROL, LDLDIRECT. Last ALT was   Lab Results   Component Value Date/Time     03/27/2020 05:16 AM   .  No muscle aches. GERD  She complains of gastroesophageal reflux with choking on food, dysphagia and heartburn. Symptoms have been present for years  Patient has had dysphagia for solids. .  Medical therapy in the past has included proton pump inhibitors. Symptoms are  Ongoing.   Had ERCP in 3/2020 that did not show an obstruction for ERCP    Cervical Dysplasia with HPV  Seeing Dr Maynor Joyce    Shoulder Pain  Had a negative xray and has had massage therapy    Past Medical History:  Past Medical History:   Diagnosis Date    Anxiety     Depression     GERD (gastroesophageal reflux disease)     HPV (human papilloma virus) anogenital infection     Hyperlipidemia     Insomnia     Premature ovarian failure      Past Surgical History:  Past Surgical History:   Procedure Laterality Date    CHOLECYSTECTOMY  03/2020    OTHER SURGICAL HISTORY      ERCP for gallstone pancreatiis     Allergies:   No Known Allergies  Medications:   Current Outpatient Medications   Medication Sig Dispense Refill    buPROPion (WELLBUTRIN XL) 150 MG extended release tablet Take 150 mg by mouth every morning       atorvastatin (LIPITOR) 40 MG tablet Take 1 tablet by mouth at bedtime 90 tablet 1    omeprazole (PRILOSEC) 20 MG delayed release capsule Take 1 capsule by mouth daily 90 capsule 1    FLUoxetine (PROZAC) 10 MG capsule Take by mouth daily      hydrOXYzine (ATARAX) 25 MG tablet Take by mouth 3 times daily as needed      LORazepam (ATIVAN) 1 MG tablet Take 1 mg by mouth 3 times daily as needed.  mirtazapine (REMERON) 30 MG tablet Take 30 mg by mouth nightly       zolpidem (AMBIEN) 10 MG tablet Take 10 mg by mouth nightly as needed. No current facility-administered medications for this visit. Social History:  Social History     Tobacco Use    Smoking status: Never Smoker    Smokeless tobacco: Never Used   Substance Use Topics    Alcohol use: No     Family History  Family History   Problem Relation Age of Onset    Stroke Mother     Dementia Father     No Known Problems Maternal Grandmother     No Known Problems Brother     No Known Problems Maternal Grandfather     Stroke Paternal Grandmother     Stroke Paternal Grandfather        Review of Systems   Constitutional: Negative for appetite change, chills, fatigue and fever. HENT: Negative for hearing loss. Eyes: Positive for visual disturbance. Respiratory: Negative for shortness of breath. Cardiovascular: Negative for chest pain and leg swelling. Gastrointestinal: Negative for abdominal pain and vomiting. Endocrine: Negative for polyuria. Genitourinary: Negative for dysuria. Musculoskeletal: Negative for arthralgias. Skin: Negative for rash. Neurological: Negative for weakness. Psychiatric/Behavioral: Positive for dysphoric mood. The patient is nervous/anxious.           Vital Signs  /84 (Site: Right Upper Arm, Position: Sitting, Cuff Size: Medium Adult)   Pulse 77   Temp 97 °F (36.1 °C) (Temporal)   Ht 5' 5\" (1.651 m) Wt 132 lb 12.8 oz (60.2 kg)   SpO2 100%   BMI 22.10 kg/m²   Body mass index is 22.1 kg/m². Physical Exam  Vitals reviewed. Constitutional:       General: She is not in acute distress. Appearance: Normal appearance. She is not ill-appearing. HENT:      Head: Normocephalic and atraumatic. Right Ear: Tympanic membrane, ear canal and external ear normal. There is no impacted cerumen. Left Ear: Tympanic membrane, ear canal and external ear normal. There is no impacted cerumen. Nose: Nose normal.      Mouth/Throat:      Mouth: Mucous membranes are moist.   Eyes:      General: No scleral icterus. Extraocular Movements: Extraocular movements intact. Conjunctiva/sclera: Conjunctivae normal.   Neck:      Vascular: No carotid bruit. Cardiovascular:      Rate and Rhythm: Normal rate and regular rhythm. Heart sounds: Normal heart sounds. No murmur heard. Pulmonary:      Effort: Pulmonary effort is normal.      Breath sounds: Normal breath sounds. Abdominal:      General: Bowel sounds are normal. There is no distension. Palpations: Abdomen is soft. There is no mass. Tenderness: There is no abdominal tenderness. Musculoskeletal:         General: No swelling. Lymphadenopathy:      Cervical: No cervical adenopathy. Skin:     Coloration: Skin is not jaundiced. Findings: No rash. Neurological:      General: No focal deficit present. Mental Status: She is alert. Mental status is at baseline. Cranial Nerves: No cranial nerve deficit. Motor: No weakness. Psychiatric:         Mood and Affect: Mood normal.         Behavior: Behavior normal.         Thought Content: Thought content normal.         Judgment: Judgment normal.           Assessment/Plan:  Jessenia was seen today for cholesterol problem. Diagnoses and all orders for this visit:    Essential hypertension  -     Basic Metabolic Panel;  Future  -     CBC with Auto Differential; Future    Other hyperlipidemia  -     atorvastatin (LIPITOR) 40 MG tablet; Take 1 tablet by mouth at bedtime  -     Hepatic Function Panel; Future  -     Lipid Panel; Future  -     CT CARDIAC CALCIUM SCORING; Future    Gastroesophageal reflux disease, unspecified whether esophagitis present  -     omeprazole (PRILOSEC) 20 MG delayed release capsule; Take 1 capsule by mouth daily    Screening mammogram for high-risk patient  -     SIENNA DIGITAL SCREEN W OR WO CAD BILATERAL; Future        Return in about 6 months (around 12/30/2022), or if symptoms worsen or fail to improve.   __  Genoveva Naqvi M.D.

## 2022-07-01 DIAGNOSIS — I10 ESSENTIAL HYPERTENSION: ICD-10-CM

## 2022-07-01 DIAGNOSIS — E78.49 OTHER HYPERLIPIDEMIA: ICD-10-CM

## 2022-07-01 LAB
ALBUMIN SERPL-MCNC: 3.7 G/DL (ref 3.5–5)
ALBUMIN/GLOB SERPL: 1.2 {RATIO} (ref 1.2–3.5)
ALP SERPL-CCNC: 115 U/L (ref 50–136)
ALT SERPL-CCNC: 36 U/L (ref 12–65)
ANION GAP SERPL CALC-SCNC: 6 MMOL/L (ref 7–16)
AST SERPL-CCNC: 29 U/L (ref 15–37)
BASOPHILS # BLD: 0 K/UL (ref 0–0.2)
BASOPHILS NFR BLD: 1 % (ref 0–2)
BILIRUB DIRECT SERPL-MCNC: 0.3 MG/DL
BILIRUB SERPL-MCNC: 0.9 MG/DL (ref 0.2–1.1)
BUN SERPL-MCNC: 12 MG/DL (ref 6–23)
CALCIUM SERPL-MCNC: 9.2 MG/DL (ref 8.3–10.4)
CHLORIDE SERPL-SCNC: 107 MMOL/L (ref 98–107)
CHOLEST SERPL-MCNC: 137 MG/DL
CO2 SERPL-SCNC: 28 MMOL/L (ref 21–32)
CREAT SERPL-MCNC: 0.8 MG/DL (ref 0.6–1)
DIFFERENTIAL METHOD BLD: NORMAL
EOSINOPHIL # BLD: 0.2 K/UL (ref 0–0.8)
EOSINOPHIL NFR BLD: 3 % (ref 0.5–7.8)
ERYTHROCYTE [DISTWIDTH] IN BLOOD BY AUTOMATED COUNT: 12.6 % (ref 11.9–14.6)
GLOBULIN SER CALC-MCNC: 3.2 G/DL (ref 2.3–3.5)
GLUCOSE SERPL-MCNC: 103 MG/DL (ref 65–100)
HCT VFR BLD AUTO: 37.2 % (ref 35.8–46.3)
HDLC SERPL-MCNC: 75 MG/DL (ref 40–60)
HDLC SERPL: 1.8 {RATIO}
HGB BLD-MCNC: 12.2 G/DL (ref 11.7–15.4)
IMM GRANULOCYTES # BLD AUTO: 0 K/UL (ref 0–0.5)
IMM GRANULOCYTES NFR BLD AUTO: 0 % (ref 0–5)
LDLC SERPL CALC-MCNC: 45.4 MG/DL
LYMPHOCYTES # BLD: 2.7 K/UL (ref 0.5–4.6)
LYMPHOCYTES NFR BLD: 41 % (ref 13–44)
MCH RBC QN AUTO: 28.6 PG (ref 26.1–32.9)
MCHC RBC AUTO-ENTMCNC: 32.8 G/DL (ref 31.4–35)
MCV RBC AUTO: 87.3 FL (ref 79.6–97.8)
MONOCYTES # BLD: 0.6 K/UL (ref 0.1–1.3)
MONOCYTES NFR BLD: 8 % (ref 4–12)
NEUTS SEG # BLD: 3.2 K/UL (ref 1.7–8.2)
NEUTS SEG NFR BLD: 47 % (ref 43–78)
NRBC # BLD: 0 K/UL (ref 0–0.2)
PLATELET # BLD AUTO: 393 K/UL (ref 150–450)
PMV BLD AUTO: 9.5 FL (ref 9.4–12.3)
POTASSIUM SERPL-SCNC: 3.7 MMOL/L (ref 3.5–5.1)
PROT SERPL-MCNC: 6.9 G/DL (ref 6.3–8.2)
RBC # BLD AUTO: 4.26 M/UL (ref 4.05–5.2)
SODIUM SERPL-SCNC: 141 MMOL/L (ref 136–145)
TRIGL SERPL-MCNC: 83 MG/DL (ref 35–150)
VLDLC SERPL CALC-MCNC: 16.6 MG/DL (ref 6–23)
WBC # BLD AUTO: 6.7 K/UL (ref 4.3–11.1)

## 2022-07-11 DIAGNOSIS — E78.49 OTHER HYPERLIPIDEMIA: ICD-10-CM

## 2022-07-23 ENCOUNTER — HOSPITAL ENCOUNTER (OUTPATIENT)
Dept: MAMMOGRAPHY | Age: 47
Discharge: HOME | End: 2022-07-26
Payer: COMMERCIAL

## 2022-07-23 DIAGNOSIS — Z12.31 SCREENING MAMMOGRAM FOR HIGH-RISK PATIENT: ICD-10-CM

## 2022-07-23 PROCEDURE — 77063 BREAST TOMOSYNTHESIS BI: CPT

## 2023-03-14 ENCOUNTER — TELEPHONE (OUTPATIENT)
Dept: INTERNAL MEDICINE CLINIC | Facility: CLINIC | Age: 48
End: 2023-03-14

## 2023-03-14 NOTE — TELEPHONE ENCOUNTER
I called and lvm for the patient .  I was returning patient call to schedule an appt for a physical and pap

## 2023-03-14 NOTE — TELEPHONE ENCOUNTER
----- Message from Shelby Sheehanwater sent at 3/13/2023  4:32 PM EDT -----  Subject: Appointment Request    Reason for Call: Established Patient Appointment needed: Routine Physical   Exam with PAP    QUESTIONS    Reason for appointment request? No appointments available during search     Additional Information for Provider? Patient called in today to book an an   Routine Physical Exam with PAP, at this time the [facility] is unable to   schedule.  Please call patient and advise of appointment options.  ---------------------------------------------------------------------------  --------------  [id number]  [phone]; OK to leave message on voicemail  ---------------------------------------------------------------------------  --------------  SCRIPT ANSWERS  COVID Screen: Ranjit Jerry

## 2023-03-27 ENCOUNTER — OFFICE VISIT (OUTPATIENT)
Dept: INTERNAL MEDICINE CLINIC | Facility: CLINIC | Age: 48
End: 2023-03-27
Payer: COMMERCIAL

## 2023-03-27 VITALS
OXYGEN SATURATION: 100 % | WEIGHT: 140 LBS | BODY MASS INDEX: 23.32 KG/M2 | TEMPERATURE: 98 F | SYSTOLIC BLOOD PRESSURE: 134 MMHG | HEIGHT: 65 IN | HEART RATE: 74 BPM | DIASTOLIC BLOOD PRESSURE: 82 MMHG

## 2023-03-27 DIAGNOSIS — Z00.00 ANNUAL PHYSICAL EXAM: ICD-10-CM

## 2023-03-27 DIAGNOSIS — F33.9 RECURRENT DEPRESSION (HCC): ICD-10-CM

## 2023-03-27 DIAGNOSIS — F41.9 ANXIETY: ICD-10-CM

## 2023-03-27 DIAGNOSIS — Z00.00 ANNUAL PHYSICAL EXAM: Primary | ICD-10-CM

## 2023-03-27 LAB
ALBUMIN SERPL-MCNC: 3.9 G/DL (ref 3.5–5)
ALBUMIN/GLOB SERPL: 1.3 (ref 0.4–1.6)
ALP SERPL-CCNC: 105 U/L (ref 50–136)
ALT SERPL-CCNC: 36 U/L (ref 12–65)
ANION GAP SERPL CALC-SCNC: 4 MMOL/L (ref 2–11)
APPEARANCE UR: CLEAR
AST SERPL-CCNC: 24 U/L (ref 15–37)
BASOPHILS # BLD: 0 K/UL (ref 0–0.2)
BASOPHILS NFR BLD: 1 % (ref 0–2)
BILIRUB DIRECT SERPL-MCNC: 0.2 MG/DL
BILIRUB SERPL-MCNC: 0.7 MG/DL (ref 0.2–1.1)
BILIRUB UR QL: NEGATIVE
BUN SERPL-MCNC: 6 MG/DL (ref 6–23)
CALCIUM SERPL-MCNC: 9.1 MG/DL (ref 8.3–10.4)
CHLORIDE SERPL-SCNC: 109 MMOL/L (ref 101–110)
CHOLEST SERPL-MCNC: 196 MG/DL
CO2 SERPL-SCNC: 27 MMOL/L (ref 21–32)
COLOR UR: ABNORMAL
CREAT SERPL-MCNC: 0.7 MG/DL (ref 0.6–1)
DIFFERENTIAL METHOD BLD: NORMAL
EOSINOPHIL # BLD: 0.2 K/UL (ref 0–0.8)
EOSINOPHIL NFR BLD: 2 % (ref 0.5–7.8)
ERYTHROCYTE [DISTWIDTH] IN BLOOD BY AUTOMATED COUNT: 11.9 % (ref 11.9–14.6)
GLOBULIN SER CALC-MCNC: 3.1 G/DL (ref 2.8–4.5)
GLUCOSE SERPL-MCNC: 100 MG/DL (ref 65–100)
GLUCOSE UR STRIP.AUTO-MCNC: NEGATIVE MG/DL
HCT VFR BLD AUTO: 41.1 % (ref 35.8–46.3)
HDLC SERPL-MCNC: 73 MG/DL (ref 40–60)
HDLC SERPL: 2.7
HGB BLD-MCNC: 13.1 G/DL (ref 11.7–15.4)
HGB UR QL STRIP: NEGATIVE
IMM GRANULOCYTES # BLD AUTO: 0 K/UL (ref 0–0.5)
IMM GRANULOCYTES NFR BLD AUTO: 0 % (ref 0–5)
KETONES UR QL STRIP.AUTO: ABNORMAL MG/DL
LDLC SERPL CALC-MCNC: 109.4 MG/DL
LEUKOCYTE ESTERASE UR QL STRIP.AUTO: NEGATIVE
LYMPHOCYTES # BLD: 2.5 K/UL (ref 0.5–4.6)
LYMPHOCYTES NFR BLD: 30 % (ref 13–44)
MCH RBC QN AUTO: 29 PG (ref 26.1–32.9)
MCHC RBC AUTO-ENTMCNC: 31.9 G/DL (ref 31.4–35)
MCV RBC AUTO: 90.9 FL (ref 82–102)
MONOCYTES # BLD: 0.7 K/UL (ref 0.1–1.3)
MONOCYTES NFR BLD: 8 % (ref 4–12)
NEUTS SEG # BLD: 4.8 K/UL (ref 1.7–8.2)
NEUTS SEG NFR BLD: 59 % (ref 43–78)
NITRITE UR QL STRIP.AUTO: NEGATIVE
NRBC # BLD: 0 K/UL (ref 0–0.2)
PH UR STRIP: 6 (ref 5–9)
PLATELET # BLD AUTO: 395 K/UL (ref 150–450)
PMV BLD AUTO: 9.5 FL (ref 9.4–12.3)
POTASSIUM SERPL-SCNC: 3.8 MMOL/L (ref 3.5–5.1)
PROT SERPL-MCNC: 7 G/DL (ref 6.3–8.2)
PROT UR STRIP-MCNC: NEGATIVE MG/DL
RBC # BLD AUTO: 4.52 M/UL (ref 4.05–5.2)
SODIUM SERPL-SCNC: 140 MMOL/L (ref 133–143)
SP GR UR REFRACTOMETRY: 1.02 (ref 1–1.02)
TRIGL SERPL-MCNC: 68 MG/DL (ref 35–150)
TSH, 3RD GENERATION: 0.72 UIU/ML (ref 0.36–3.74)
UROBILINOGEN UR QL STRIP.AUTO: 0.2 EU/DL (ref 0.2–1)
VLDLC SERPL CALC-MCNC: 13.6 MG/DL (ref 6–23)
WBC # BLD AUTO: 8.3 K/UL (ref 4.3–11.1)

## 2023-03-27 PROCEDURE — 99396 PREV VISIT EST AGE 40-64: CPT | Performed by: INTERNAL MEDICINE

## 2023-03-27 RX ORDER — ESTRADIOL 0.1 MG/G
2 CREAM VAGINAL DAILY
COMMUNITY

## 2023-03-27 RX ORDER — TEMAZEPAM 30 MG/1
30 CAPSULE ORAL NIGHTLY PRN
COMMUNITY

## 2023-03-27 RX ORDER — MODAFINIL 100 MG/1
100 TABLET ORAL DAILY
COMMUNITY

## 2023-03-27 SDOH — ECONOMIC STABILITY: INCOME INSECURITY: HOW HARD IS IT FOR YOU TO PAY FOR THE VERY BASICS LIKE FOOD, HOUSING, MEDICAL CARE, AND HEATING?: NOT HARD AT ALL

## 2023-03-27 SDOH — ECONOMIC STABILITY: FOOD INSECURITY: WITHIN THE PAST 12 MONTHS, THE FOOD YOU BOUGHT JUST DIDN'T LAST AND YOU DIDN'T HAVE MONEY TO GET MORE.: NEVER TRUE

## 2023-03-27 SDOH — ECONOMIC STABILITY: FOOD INSECURITY: WITHIN THE PAST 12 MONTHS, YOU WORRIED THAT YOUR FOOD WOULD RUN OUT BEFORE YOU GOT MONEY TO BUY MORE.: NEVER TRUE

## 2023-03-27 SDOH — ECONOMIC STABILITY: HOUSING INSECURITY
IN THE LAST 12 MONTHS, WAS THERE A TIME WHEN YOU DID NOT HAVE A STEADY PLACE TO SLEEP OR SLEPT IN A SHELTER (INCLUDING NOW)?: NO

## 2023-03-27 ASSESSMENT — ANXIETY QUESTIONNAIRES
5. BEING SO RESTLESS THAT IT IS HARD TO SIT STILL: 0
IF YOU CHECKED OFF ANY PROBLEMS ON THIS QUESTIONNAIRE, HOW DIFFICULT HAVE THESE PROBLEMS MADE IT FOR YOU TO DO YOUR WORK, TAKE CARE OF THINGS AT HOME, OR GET ALONG WITH OTHER PEOPLE: NOT DIFFICULT AT ALL
1. FEELING NERVOUS, ANXIOUS, OR ON EDGE: 0
2. NOT BEING ABLE TO STOP OR CONTROL WORRYING: 0
4. TROUBLE RELAXING: 0
3. WORRYING TOO MUCH ABOUT DIFFERENT THINGS: 0
GAD7 TOTAL SCORE: 0
6. BECOMING EASILY ANNOYED OR IRRITABLE: 0
7. FEELING AFRAID AS IF SOMETHING AWFUL MIGHT HAPPEN: 0

## 2023-03-27 ASSESSMENT — PATIENT HEALTH QUESTIONNAIRE - PHQ9
SUM OF ALL RESPONSES TO PHQ9 QUESTIONS 1 & 2: 0
8. MOVING OR SPEAKING SO SLOWLY THAT OTHER PEOPLE COULD HAVE NOTICED. OR THE OPPOSITE, BEING SO FIGETY OR RESTLESS THAT YOU HAVE BEEN MOVING AROUND A LOT MORE THAN USUAL: 0
10. IF YOU CHECKED OFF ANY PROBLEMS, HOW DIFFICULT HAVE THESE PROBLEMS MADE IT FOR YOU TO DO YOUR WORK, TAKE CARE OF THINGS AT HOME, OR GET ALONG WITH OTHER PEOPLE: 0
6. FEELING BAD ABOUT YOURSELF - OR THAT YOU ARE A FAILURE OR HAVE LET YOURSELF OR YOUR FAMILY DOWN: 0
SUM OF ALL RESPONSES TO PHQ QUESTIONS 1-9: 0
2. FEELING DOWN, DEPRESSED OR HOPELESS: 0
7. TROUBLE CONCENTRATING ON THINGS, SUCH AS READING THE NEWSPAPER OR WATCHING TELEVISION: 0
3. TROUBLE FALLING OR STAYING ASLEEP: 0
9. THOUGHTS THAT YOU WOULD BE BETTER OFF DEAD, OR OF HURTING YOURSELF: 0
SUM OF ALL RESPONSES TO PHQ QUESTIONS 1-9: 0
SUM OF ALL RESPONSES TO PHQ QUESTIONS 1-9: 0
4. FEELING TIRED OR HAVING LITTLE ENERGY: 0
5. POOR APPETITE OR OVEREATING: 0
1. LITTLE INTEREST OR PLEASURE IN DOING THINGS: 0
SUM OF ALL RESPONSES TO PHQ QUESTIONS 1-9: 0

## 2023-03-27 ASSESSMENT — ENCOUNTER SYMPTOMS
FACIAL SWELLING: 0
ABDOMINAL PAIN: 0
SHORTNESS OF BREATH: 0
EYE REDNESS: 0

## 2023-03-27 NOTE — PROGRESS NOTES
Psychiatric/Behavioral:  Negative for dysphoric mood. Vital Signs  /82 (Site: Left Upper Arm, Position: Sitting, Cuff Size: Large Adult)   Pulse 74   Temp 98 °F (36.7 °C) (Temporal)   Ht 5' 5\" (1.651 m)   Wt 140 lb (63.5 kg)   SpO2 100%   BMI 23.30 kg/m²   Body mass index is 23.3 kg/m². Physical Exam  Vitals reviewed. Constitutional:       General: She is not in acute distress. Appearance: Normal appearance. She is not ill-appearing. HENT:      Head: Normocephalic and atraumatic. Right Ear: Tympanic membrane, ear canal and external ear normal. There is no impacted cerumen. Left Ear: Tympanic membrane, ear canal and external ear normal. There is no impacted cerumen. Nose: Nose normal.      Mouth/Throat:      Mouth: Mucous membranes are moist.      Pharynx: No oropharyngeal exudate. Eyes:      General: No scleral icterus. Extraocular Movements: Extraocular movements intact. Conjunctiva/sclera: Conjunctivae normal.   Neck:      Vascular: No carotid bruit. Cardiovascular:      Rate and Rhythm: Normal rate and regular rhythm. Heart sounds: Normal heart sounds. No murmur heard. Pulmonary:      Effort: Pulmonary effort is normal.      Breath sounds: Normal breath sounds. Abdominal:      General: Bowel sounds are normal. There is no distension. Palpations: Abdomen is soft. There is no mass. Tenderness: There is no abdominal tenderness. Musculoskeletal:         General: No swelling. Lymphadenopathy:      Cervical: No cervical adenopathy. Skin:     Coloration: Skin is not jaundiced. Findings: No rash. Neurological:      General: No focal deficit present. Mental Status: She is alert. Mental status is at baseline. Cranial Nerves: No cranial nerve deficit. Motor: No weakness.       Gait: Gait normal.      Deep Tendon Reflexes: Reflexes normal.   Psychiatric:         Mood and Affect: Mood normal.         Behavior: Behavior

## 2023-08-07 ENCOUNTER — OFFICE VISIT (OUTPATIENT)
Dept: INTERNAL MEDICINE CLINIC | Facility: CLINIC | Age: 48
End: 2023-08-07
Payer: COMMERCIAL

## 2023-08-07 VITALS
WEIGHT: 128.8 LBS | OXYGEN SATURATION: 98 % | TEMPERATURE: 98 F | HEIGHT: 65 IN | BODY MASS INDEX: 21.46 KG/M2 | SYSTOLIC BLOOD PRESSURE: 121 MMHG | DIASTOLIC BLOOD PRESSURE: 88 MMHG | HEART RATE: 99 BPM

## 2023-08-07 DIAGNOSIS — F51.05 INSOMNIA DUE TO OTHER MENTAL DISORDER: Primary | ICD-10-CM

## 2023-08-07 DIAGNOSIS — F41.9 ANXIETY: ICD-10-CM

## 2023-08-07 DIAGNOSIS — F99 INSOMNIA DUE TO OTHER MENTAL DISORDER: Primary | ICD-10-CM

## 2023-08-07 DIAGNOSIS — F33.9 RECURRENT DEPRESSION (HCC): ICD-10-CM

## 2023-08-07 PROCEDURE — 99214 OFFICE O/P EST MOD 30 MIN: CPT | Performed by: INTERNAL MEDICINE

## 2023-08-07 RX ORDER — SUVOREXANT 20 MG/1
20 TABLET, FILM COATED ORAL
Qty: 30 TABLET | Refills: 3 | Status: SHIPPED | OUTPATIENT
Start: 2023-08-07 | End: 2023-12-05

## 2023-08-07 RX ORDER — BUSPIRONE HYDROCHLORIDE 10 MG/1
10 TABLET ORAL 2 TIMES DAILY
Qty: 60 TABLET | Refills: 3 | Status: SHIPPED | OUTPATIENT
Start: 2023-08-07 | End: 2023-12-05

## 2023-08-07 RX ORDER — MIRTAZAPINE 30 MG/1
30 TABLET, FILM COATED ORAL NIGHTLY
Qty: 30 TABLET | Refills: 3 | Status: SHIPPED | OUTPATIENT
Start: 2023-08-07

## 2023-08-07 ASSESSMENT — ENCOUNTER SYMPTOMS: SHORTNESS OF BREATH: 0

## 2023-08-07 NOTE — PROGRESS NOTES
Jaquelin Morton M.D. Internal Medicine  [address]  Office : [phone]  Fax : [phone]    Chief Complaint   Patient presents with    Insomnia     Pt states she is having issues with insomnia and anxiety. She states the only medications that hep her are Lorazepam and Temazepam. She is not taking any medications that have prescribed previously. History of Present Illness:  Merly Vazquez is a 49 y.o. female. Insomnia  This is a chronic problem. The current episode started more than 1 year ago. The problem occurs constantly. The problem has been unchanged. Pertinent negatives include no chest pain or fatigue. Nothing aggravates the symptoms. Treatments tried: Jaylon Breeding. The treatment provided significant relief. Depression  Is adjusting her medication on her own. Never kept appt with psychiatry due to insurance issues    Past Medical History:  Past Medical History:   Diagnosis Date    Anxiety     Depression     GERD (gastroesophageal reflux disease)     HPV (human papilloma virus) anogenital infection     Hyperlipidemia     Insomnia     Premature ovarian failure      Past Surgical History:  Past Surgical History:   Procedure Laterality Date    BREAST BIOPSY Right     2019? CHOLECYSTECTOMY  03/2020    OTHER SURGICAL HISTORY      ERCP for gallstone pancreatiis     Allergies:   No Known Allergies  Medications:   Current Outpatient Medications   Medication Sig Dispense Refill    Suvorexant (BELSOMRA) 20 MG TABS Take 1 tablet by mouth nightly for 120 days.  Max Daily Amount: 20 mg 30 tablet 3    busPIRone (BUSPAR) 10 MG tablet Take 1 tablet by mouth 2 times daily 60 tablet 3    mirtazapine (REMERON) 30 MG tablet Take 1 tablet by mouth nightly 30 tablet 3    estradiol (ESTRACE) 0.1 MG/GM vaginal cream Place 2 g vaginally daily      atorvastatin (LIPITOR) 40 MG tablet Take 1 tablet by mouth at

## 2023-11-01 ENCOUNTER — OFFICE VISIT (OUTPATIENT)
Dept: BEHAVIORAL/MENTAL HEALTH CLINIC | Age: 48
End: 2023-11-01
Payer: COMMERCIAL

## 2023-11-01 VITALS
OXYGEN SATURATION: 98 % | DIASTOLIC BLOOD PRESSURE: 82 MMHG | SYSTOLIC BLOOD PRESSURE: 122 MMHG | TEMPERATURE: 98.6 F | HEART RATE: 80 BPM

## 2023-11-01 DIAGNOSIS — F33.1 MDD (MAJOR DEPRESSIVE DISORDER), RECURRENT EPISODE, MODERATE (HCC): Primary | ICD-10-CM

## 2023-11-01 PROCEDURE — 90792 PSYCH DIAG EVAL W/MED SRVCS: CPT | Performed by: STUDENT IN AN ORGANIZED HEALTH CARE EDUCATION/TRAINING PROGRAM

## 2023-11-01 ASSESSMENT — PATIENT HEALTH QUESTIONNAIRE - PHQ9
4. FEELING TIRED OR HAVING LITTLE ENERGY: 0
SUM OF ALL RESPONSES TO PHQ QUESTIONS 1-9: 0
7. TROUBLE CONCENTRATING ON THINGS, SUCH AS READING THE NEWSPAPER OR WATCHING TELEVISION: 0
1. LITTLE INTEREST OR PLEASURE IN DOING THINGS: 0
5. POOR APPETITE OR OVEREATING: 0
SUM OF ALL RESPONSES TO PHQ QUESTIONS 1-9: 0
9. THOUGHTS THAT YOU WOULD BE BETTER OFF DEAD, OR OF HURTING YOURSELF: 0
6. FEELING BAD ABOUT YOURSELF - OR THAT YOU ARE A FAILURE OR HAVE LET YOURSELF OR YOUR FAMILY DOWN: 0
SUM OF ALL RESPONSES TO PHQ9 QUESTIONS 1 & 2: 0
3. TROUBLE FALLING OR STAYING ASLEEP: 0
8. MOVING OR SPEAKING SO SLOWLY THAT OTHER PEOPLE COULD HAVE NOTICED. OR THE OPPOSITE, BEING SO FIGETY OR RESTLESS THAT YOU HAVE BEEN MOVING AROUND A LOT MORE THAN USUAL: 0
10. IF YOU CHECKED OFF ANY PROBLEMS, HOW DIFFICULT HAVE THESE PROBLEMS MADE IT FOR YOU TO DO YOUR WORK, TAKE CARE OF THINGS AT HOME, OR GET ALONG WITH OTHER PEOPLE: 0
2. FEELING DOWN, DEPRESSED OR HOPELESS: 0

## 2023-11-01 ASSESSMENT — COLUMBIA-SUICIDE SEVERITY RATING SCALE - C-SSRS
7. DID THIS OCCUR IN THE LAST THREE MONTHS: NO
4. HAVE YOU HAD THESE THOUGHTS AND HAD SOME INTENTION OF ACTING ON THEM?: NO
5. HAVE YOU STARTED TO WORK OUT OR WORKED OUT THE DETAILS OF HOW TO KILL YOURSELF? DO YOU INTEND TO CARRY OUT THIS PLAN?: NO
3. HAVE YOU BEEN THINKING ABOUT HOW YOU MIGHT KILL YOURSELF?: NO
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
## 2023-11-08 ENCOUNTER — OFFICE VISIT (OUTPATIENT)
Dept: BEHAVIORAL/MENTAL HEALTH CLINIC | Age: 48
End: 2023-11-08
Payer: COMMERCIAL

## 2023-11-08 VITALS
OXYGEN SATURATION: 98 % | SYSTOLIC BLOOD PRESSURE: 118 MMHG | TEMPERATURE: 98 F | DIASTOLIC BLOOD PRESSURE: 76 MMHG | HEART RATE: 80 BPM

## 2023-11-08 DIAGNOSIS — F33.1 MDD (MAJOR DEPRESSIVE DISORDER), RECURRENT EPISODE, MODERATE (HCC): Primary | ICD-10-CM

## 2023-11-08 PROCEDURE — 99215 OFFICE O/P EST HI 40 MIN: CPT | Performed by: STUDENT IN AN ORGANIZED HEALTH CARE EDUCATION/TRAINING PROGRAM

## 2023-11-08 RX ORDER — MIRTAZAPINE 45 MG/1
45 TABLET, FILM COATED ORAL NIGHTLY
Qty: 30 TABLET | Refills: 2 | Status: SHIPPED | OUTPATIENT
Start: 2023-11-08

## 2023-11-08 ASSESSMENT — PATIENT HEALTH QUESTIONNAIRE - PHQ9
6. FEELING BAD ABOUT YOURSELF - OR THAT YOU ARE A FAILURE OR HAVE LET YOURSELF OR YOUR FAMILY DOWN: 0
SUM OF ALL RESPONSES TO PHQ QUESTIONS 1-9: 0
8. MOVING OR SPEAKING SO SLOWLY THAT OTHER PEOPLE COULD HAVE NOTICED. OR THE OPPOSITE, BEING SO FIGETY OR RESTLESS THAT YOU HAVE BEEN MOVING AROUND A LOT MORE THAN USUAL: 0
SUM OF ALL RESPONSES TO PHQ QUESTIONS 1-9: 0
4. FEELING TIRED OR HAVING LITTLE ENERGY: 0
9. THOUGHTS THAT YOU WOULD BE BETTER OFF DEAD, OR OF HURTING YOURSELF: 0
10. IF YOU CHECKED OFF ANY PROBLEMS, HOW DIFFICULT HAVE THESE PROBLEMS MADE IT FOR YOU TO DO YOUR WORK, TAKE CARE OF THINGS AT HOME, OR GET ALONG WITH OTHER PEOPLE: 0
7. TROUBLE CONCENTRATING ON THINGS, SUCH AS READING THE NEWSPAPER OR WATCHING TELEVISION: 0
1. LITTLE INTEREST OR PLEASURE IN DOING THINGS: 0
3. TROUBLE FALLING OR STAYING ASLEEP: 0
5. POOR APPETITE OR OVEREATING: 0
SUM OF ALL RESPONSES TO PHQ9 QUESTIONS 1 & 2: 0
2. FEELING DOWN, DEPRESSED OR HOPELESS: 0

## 2023-11-08 ASSESSMENT — COLUMBIA-SUICIDE SEVERITY RATING SCALE - C-SSRS
3. HAVE YOU BEEN THINKING ABOUT HOW YOU MIGHT KILL YOURSELF?: NO
7. DID THIS OCCUR IN THE LAST THREE MONTHS: NO
4. HAVE YOU HAD THESE THOUGHTS AND HAD SOME INTENTION OF ACTING ON THEM?: NO
5. HAVE YOU STARTED TO WORK OUT OR WORKED OUT THE DETAILS OF HOW TO KILL YOURSELF? DO YOU INTEND TO CARRY OUT THIS PLAN?: NO
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
## 2023-12-12 ENCOUNTER — OFFICE VISIT (OUTPATIENT)
Dept: BEHAVIORAL/MENTAL HEALTH CLINIC | Age: 48
End: 2023-12-12
Payer: COMMERCIAL

## 2023-12-12 VITALS
OXYGEN SATURATION: 98 % | TEMPERATURE: 98 F | HEART RATE: 82 BPM | DIASTOLIC BLOOD PRESSURE: 82 MMHG | SYSTOLIC BLOOD PRESSURE: 120 MMHG

## 2023-12-12 DIAGNOSIS — F33.41 MDD (MAJOR DEPRESSIVE DISORDER), RECURRENT, IN PARTIAL REMISSION (HCC): Primary | ICD-10-CM

## 2023-12-12 DIAGNOSIS — F43.10 COMPLEX POSTTRAUMATIC STRESS DISORDER: ICD-10-CM

## 2023-12-12 PROCEDURE — 99214 OFFICE O/P EST MOD 30 MIN: CPT | Performed by: STUDENT IN AN ORGANIZED HEALTH CARE EDUCATION/TRAINING PROGRAM

## 2023-12-12 RX ORDER — BUPROPION HYDROCHLORIDE 150 MG/1
150 TABLET ORAL EVERY MORNING
Qty: 30 TABLET | Refills: 2 | Status: SHIPPED | OUTPATIENT
Start: 2023-12-12

## 2023-12-12 ASSESSMENT — PATIENT HEALTH QUESTIONNAIRE - PHQ9
6. FEELING BAD ABOUT YOURSELF - OR THAT YOU ARE A FAILURE OR HAVE LET YOURSELF OR YOUR FAMILY DOWN: 0
5. POOR APPETITE OR OVEREATING: 1
7. TROUBLE CONCENTRATING ON THINGS, SUCH AS READING THE NEWSPAPER OR WATCHING TELEVISION: 0
2. FEELING DOWN, DEPRESSED OR HOPELESS: 0
3. TROUBLE FALLING OR STAYING ASLEEP: 0
10. IF YOU CHECKED OFF ANY PROBLEMS, HOW DIFFICULT HAVE THESE PROBLEMS MADE IT FOR YOU TO DO YOUR WORK, TAKE CARE OF THINGS AT HOME, OR GET ALONG WITH OTHER PEOPLE: 0
SUM OF ALL RESPONSES TO PHQ QUESTIONS 1-9: 1
SUM OF ALL RESPONSES TO PHQ QUESTIONS 1-9: 1
4. FEELING TIRED OR HAVING LITTLE ENERGY: 0
1. LITTLE INTEREST OR PLEASURE IN DOING THINGS: 0
9. THOUGHTS THAT YOU WOULD BE BETTER OFF DEAD, OR OF HURTING YOURSELF: 0
SUM OF ALL RESPONSES TO PHQ QUESTIONS 1-9: 1
SUM OF ALL RESPONSES TO PHQ9 QUESTIONS 1 & 2: 0
SUM OF ALL RESPONSES TO PHQ QUESTIONS 1-9: 1
8. MOVING OR SPEAKING SO SLOWLY THAT OTHER PEOPLE COULD HAVE NOTICED. OR THE OPPOSITE, BEING SO FIGETY OR RESTLESS THAT YOU HAVE BEEN MOVING AROUND A LOT MORE THAN USUAL: 0
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
## 2024-02-12 ENCOUNTER — OFFICE VISIT (OUTPATIENT)
Dept: BEHAVIORAL/MENTAL HEALTH CLINIC | Age: 49
End: 2024-02-12
Payer: COMMERCIAL

## 2024-02-12 VITALS
TEMPERATURE: 98.6 F | OXYGEN SATURATION: 98 % | HEART RATE: 98 BPM | SYSTOLIC BLOOD PRESSURE: 120 MMHG | DIASTOLIC BLOOD PRESSURE: 86 MMHG

## 2024-02-12 DIAGNOSIS — F43.10 COMPLEX POSTTRAUMATIC STRESS DISORDER: Primary | ICD-10-CM

## 2024-02-12 DIAGNOSIS — F33.0 MDD (MAJOR DEPRESSIVE DISORDER), RECURRENT EPISODE, MILD (HCC): ICD-10-CM

## 2024-02-12 PROCEDURE — 99214 OFFICE O/P EST MOD 30 MIN: CPT | Performed by: STUDENT IN AN ORGANIZED HEALTH CARE EDUCATION/TRAINING PROGRAM

## 2024-02-12 RX ORDER — MIRTAZAPINE 45 MG/1
45 TABLET, FILM COATED ORAL NIGHTLY
Qty: 90 TABLET | Refills: 1 | Status: SHIPPED | OUTPATIENT
Start: 2024-02-12

## 2024-02-12 RX ORDER — BUPROPION HYDROCHLORIDE 300 MG/1
300 TABLET ORAL EVERY MORNING
Qty: 30 TABLET | Refills: 2 | Status: SHIPPED | OUTPATIENT
Start: 2024-02-12
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
## 2024-02-23 ENCOUNTER — OFFICE VISIT (OUTPATIENT)
Dept: BEHAVIORAL/MENTAL HEALTH CLINIC | Age: 49
End: 2024-02-23
Payer: COMMERCIAL

## 2024-02-23 VITALS
DIASTOLIC BLOOD PRESSURE: 84 MMHG | SYSTOLIC BLOOD PRESSURE: 130 MMHG | HEART RATE: 99 BPM | OXYGEN SATURATION: 98 % | TEMPERATURE: 98.9 F

## 2024-02-23 DIAGNOSIS — F43.10 COMPLEX POSTTRAUMATIC STRESS DISORDER: Primary | ICD-10-CM

## 2024-02-23 DIAGNOSIS — F33.0 MDD (MAJOR DEPRESSIVE DISORDER), RECURRENT EPISODE, MILD (HCC): ICD-10-CM

## 2024-02-23 PROCEDURE — 99214 OFFICE O/P EST MOD 30 MIN: CPT | Performed by: STUDENT IN AN ORGANIZED HEALTH CARE EDUCATION/TRAINING PROGRAM

## 2024-02-23 RX ORDER — FLUOXETINE HYDROCHLORIDE 20 MG/1
20 CAPSULE ORAL DAILY
Qty: 30 CAPSULE | Refills: 2 | Status: SHIPPED | OUTPATIENT
Start: 2024-02-23

## 2024-02-23 RX ORDER — PRAZOSIN HYDROCHLORIDE 1 MG/1
CAPSULE ORAL
Qty: 90 CAPSULE | Refills: 2 | Status: SHIPPED | OUTPATIENT
Start: 2024-02-23

## 2024-02-23 ASSESSMENT — PATIENT HEALTH QUESTIONNAIRE - PHQ9
SUM OF ALL RESPONSES TO PHQ QUESTIONS 1-9: 8
10. IF YOU CHECKED OFF ANY PROBLEMS, HOW DIFFICULT HAVE THESE PROBLEMS MADE IT FOR YOU TO DO YOUR WORK, TAKE CARE OF THINGS AT HOME, OR GET ALONG WITH OTHER PEOPLE: 0
1. LITTLE INTEREST OR PLEASURE IN DOING THINGS: 3
2. FEELING DOWN, DEPRESSED OR HOPELESS: 3
3. TROUBLE FALLING OR STAYING ASLEEP: 0
SUM OF ALL RESPONSES TO PHQ QUESTIONS 1-9: 8
9. THOUGHTS THAT YOU WOULD BE BETTER OFF DEAD, OR OF HURTING YOURSELF: 0
7. TROUBLE CONCENTRATING ON THINGS, SUCH AS READING THE NEWSPAPER OR WATCHING TELEVISION: 1
6. FEELING BAD ABOUT YOURSELF - OR THAT YOU ARE A FAILURE OR HAVE LET YOURSELF OR YOUR FAMILY DOWN: 1
8. MOVING OR SPEAKING SO SLOWLY THAT OTHER PEOPLE COULD HAVE NOTICED. OR THE OPPOSITE, BEING SO FIGETY OR RESTLESS THAT YOU HAVE BEEN MOVING AROUND A LOT MORE THAN USUAL: 0
4. FEELING TIRED OR HAVING LITTLE ENERGY: 0
SUM OF ALL RESPONSES TO PHQ9 QUESTIONS 1 & 2: 6
5. POOR APPETITE OR OVEREATING: 0

## 2024-02-23 NOTE — PATIENT INSTRUCTIONS
The Body Keeps the Score    Complex PTSD: From Surviving to Thriving    Angel Kamara - YouTube    Holistic Psychologist - Instagram

## 2024-03-25 ENCOUNTER — OFFICE VISIT (OUTPATIENT)
Dept: BEHAVIORAL/MENTAL HEALTH CLINIC | Age: 49
End: 2024-03-25
Payer: COMMERCIAL

## 2024-03-25 VITALS
SYSTOLIC BLOOD PRESSURE: 120 MMHG | HEART RATE: 90 BPM | DIASTOLIC BLOOD PRESSURE: 80 MMHG | TEMPERATURE: 98 F | OXYGEN SATURATION: 99 %

## 2024-03-25 DIAGNOSIS — F33.0 MDD (MAJOR DEPRESSIVE DISORDER), RECURRENT EPISODE, MILD (HCC): ICD-10-CM

## 2024-03-25 DIAGNOSIS — F43.10 COMPLEX POSTTRAUMATIC STRESS DISORDER: Primary | ICD-10-CM

## 2024-03-25 PROCEDURE — 99214 OFFICE O/P EST MOD 30 MIN: CPT | Performed by: STUDENT IN AN ORGANIZED HEALTH CARE EDUCATION/TRAINING PROGRAM
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
## 2024-04-16 SDOH — ECONOMIC STABILITY: TRANSPORTATION INSECURITY
IN THE PAST 12 MONTHS, HAS LACK OF TRANSPORTATION KEPT YOU FROM MEETINGS, WORK, OR FROM GETTING THINGS NEEDED FOR DAILY LIVING?: NO

## 2024-04-16 SDOH — ECONOMIC STABILITY: INCOME INSECURITY: HOW HARD IS IT FOR YOU TO PAY FOR THE VERY BASICS LIKE FOOD, HOUSING, MEDICAL CARE, AND HEATING?: NOT VERY HARD

## 2024-04-16 SDOH — ECONOMIC STABILITY: FOOD INSECURITY: WITHIN THE PAST 12 MONTHS, THE FOOD YOU BOUGHT JUST DIDN'T LAST AND YOU DIDN'T HAVE MONEY TO GET MORE.: NEVER TRUE

## 2024-04-16 SDOH — ECONOMIC STABILITY: FOOD INSECURITY: WITHIN THE PAST 12 MONTHS, YOU WORRIED THAT YOUR FOOD WOULD RUN OUT BEFORE YOU GOT MONEY TO BUY MORE.: NEVER TRUE

## 2024-04-16 ASSESSMENT — PATIENT HEALTH QUESTIONNAIRE - PHQ9
10. IF YOU CHECKED OFF ANY PROBLEMS, HOW DIFFICULT HAVE THESE PROBLEMS MADE IT FOR YOU TO DO YOUR WORK, TAKE CARE OF THINGS AT HOME, OR GET ALONG WITH OTHER PEOPLE: NOT DIFFICULT AT ALL
4. FEELING TIRED OR HAVING LITTLE ENERGY: SEVERAL DAYS
1. LITTLE INTEREST OR PLEASURE IN DOING THINGS: SEVERAL DAYS
9. THOUGHTS THAT YOU WOULD BE BETTER OFF DEAD, OR OF HURTING YOURSELF: NOT AT ALL
SUM OF ALL RESPONSES TO PHQ QUESTIONS 1-9: 5
2. FEELING DOWN, DEPRESSED OR HOPELESS: SEVERAL DAYS
3. TROUBLE FALLING OR STAYING ASLEEP: NOT AT ALL
1. LITTLE INTEREST OR PLEASURE IN DOING THINGS: SEVERAL DAYS
7. TROUBLE CONCENTRATING ON THINGS, SUCH AS READING THE NEWSPAPER OR WATCHING TELEVISION: NOT AT ALL
SUM OF ALL RESPONSES TO PHQ9 QUESTIONS 1 & 2: 2
SUM OF ALL RESPONSES TO PHQ QUESTIONS 1-9: 5
5. POOR APPETITE OR OVEREATING: SEVERAL DAYS
6. FEELING BAD ABOUT YOURSELF - OR THAT YOU ARE A FAILURE OR HAVE LET YOURSELF OR YOUR FAMILY DOWN: SEVERAL DAYS
SUM OF ALL RESPONSES TO PHQ QUESTIONS 1-9: 5
7. TROUBLE CONCENTRATING ON THINGS, SUCH AS READING THE NEWSPAPER OR WATCHING TELEVISION: NOT AT ALL
3. TROUBLE FALLING OR STAYING ASLEEP: NOT AT ALL
8. MOVING OR SPEAKING SO SLOWLY THAT OTHER PEOPLE COULD HAVE NOTICED. OR THE OPPOSITE - BEING SO FIDGETY OR RESTLESS THAT YOU HAVE BEEN MOVING AROUND A LOT MORE THAN USUAL: NOT AT ALL
2. FEELING DOWN, DEPRESSED OR HOPELESS: SEVERAL DAYS
SUM OF ALL RESPONSES TO PHQ QUESTIONS 1-9: 5
6. FEELING BAD ABOUT YOURSELF - OR THAT YOU ARE A FAILURE OR HAVE LET YOURSELF OR YOUR FAMILY DOWN: SEVERAL DAYS
8. MOVING OR SPEAKING SO SLOWLY THAT OTHER PEOPLE COULD HAVE NOTICED. OR THE OPPOSITE, BEING SO FIGETY OR RESTLESS THAT YOU HAVE BEEN MOVING AROUND A LOT MORE THAN USUAL: NOT AT ALL
10. IF YOU CHECKED OFF ANY PROBLEMS, HOW DIFFICULT HAVE THESE PROBLEMS MADE IT FOR YOU TO DO YOUR WORK, TAKE CARE OF THINGS AT HOME, OR GET ALONG WITH OTHER PEOPLE: NOT DIFFICULT AT ALL
9. THOUGHTS THAT YOU WOULD BE BETTER OFF DEAD, OR OF HURTING YOURSELF: NOT AT ALL
4. FEELING TIRED OR HAVING LITTLE ENERGY: SEVERAL DAYS
SUM OF ALL RESPONSES TO PHQ QUESTIONS 1-9: 5
5. POOR APPETITE OR OVEREATING: SEVERAL DAYS

## 2024-04-19 ENCOUNTER — OFFICE VISIT (OUTPATIENT)
Dept: INTERNAL MEDICINE CLINIC | Facility: CLINIC | Age: 49
End: 2024-04-19
Payer: COMMERCIAL

## 2024-04-19 VITALS
DIASTOLIC BLOOD PRESSURE: 82 MMHG | HEART RATE: 75 BPM | SYSTOLIC BLOOD PRESSURE: 116 MMHG | WEIGHT: 150 LBS | HEIGHT: 65 IN | BODY MASS INDEX: 24.99 KG/M2 | OXYGEN SATURATION: 100 % | TEMPERATURE: 98.1 F

## 2024-04-19 DIAGNOSIS — Z00.00 ANNUAL PHYSICAL EXAM: Primary | ICD-10-CM

## 2024-04-19 DIAGNOSIS — Z00.00 ANNUAL PHYSICAL EXAM: ICD-10-CM

## 2024-04-19 DIAGNOSIS — K21.9 GASTROESOPHAGEAL REFLUX DISEASE, UNSPECIFIED WHETHER ESOPHAGITIS PRESENT: ICD-10-CM

## 2024-04-19 DIAGNOSIS — E78.49 OTHER HYPERLIPIDEMIA: ICD-10-CM

## 2024-04-19 DIAGNOSIS — Z12.31 SCREENING MAMMOGRAM FOR HIGH-RISK PATIENT: ICD-10-CM

## 2024-04-19 LAB
ALBUMIN SERPL-MCNC: 3.9 G/DL (ref 3.5–5)
ALBUMIN/GLOB SERPL: 1.2 (ref 0.4–1.6)
ALP SERPL-CCNC: 125 U/L (ref 50–136)
ALT SERPL-CCNC: 29 U/L (ref 12–65)
ANION GAP SERPL CALC-SCNC: 5 MMOL/L (ref 2–11)
APPEARANCE UR: ABNORMAL
AST SERPL-CCNC: 16 U/L (ref 15–37)
BACTERIA URNS QL MICRO: ABNORMAL /HPF
BASOPHILS # BLD: 0.1 K/UL (ref 0–0.2)
BASOPHILS NFR BLD: 1 % (ref 0–2)
BILIRUB DIRECT SERPL-MCNC: 0.1 MG/DL
BILIRUB SERPL-MCNC: 0.7 MG/DL (ref 0.2–1.1)
BILIRUB UR QL: NEGATIVE
BUN SERPL-MCNC: 12 MG/DL (ref 6–23)
CALCIUM SERPL-MCNC: 9 MG/DL (ref 8.3–10.4)
CHLORIDE SERPL-SCNC: 107 MMOL/L (ref 103–113)
CHOLEST SERPL-MCNC: 229 MG/DL
CO2 SERPL-SCNC: 28 MMOL/L (ref 21–32)
COLOR UR: ABNORMAL
CREAT SERPL-MCNC: 0.8 MG/DL (ref 0.6–1)
CRYSTALS URNS QL MICRO: ABNORMAL /LPF
DIFFERENTIAL METHOD BLD: ABNORMAL
EOSINOPHIL # BLD: 0.2 K/UL (ref 0–0.8)
EOSINOPHIL NFR BLD: 3 % (ref 0.5–7.8)
EPI CELLS #/AREA URNS HPF: ABNORMAL /HPF
ERYTHROCYTE [DISTWIDTH] IN BLOOD BY AUTOMATED COUNT: 12.1 % (ref 11.9–14.6)
GLOBULIN SER CALC-MCNC: 3.2 G/DL (ref 2.8–4.5)
GLUCOSE SERPL-MCNC: 95 MG/DL (ref 65–100)
GLUCOSE UR STRIP.AUTO-MCNC: NEGATIVE MG/DL
HCT VFR BLD AUTO: 39.7 % (ref 35.8–46.3)
HDLC SERPL-MCNC: 53 MG/DL (ref 40–60)
HDLC SERPL: 4.3
HGB BLD-MCNC: 12.6 G/DL (ref 11.7–15.4)
HGB UR QL STRIP: NEGATIVE
IMM GRANULOCYTES # BLD AUTO: 0 K/UL (ref 0–0.5)
IMM GRANULOCYTES NFR BLD AUTO: 0 % (ref 0–5)
KETONES UR QL STRIP.AUTO: NEGATIVE MG/DL
LDLC SERPL CALC-MCNC: 144 MG/DL
LEUKOCYTE ESTERASE UR QL STRIP.AUTO: ABNORMAL
LYMPHOCYTES # BLD: 2.5 K/UL (ref 0.5–4.6)
LYMPHOCYTES NFR BLD: 34 % (ref 13–44)
MCH RBC QN AUTO: 28.6 PG (ref 26.1–32.9)
MCHC RBC AUTO-ENTMCNC: 31.7 G/DL (ref 31.4–35)
MCV RBC AUTO: 90.2 FL (ref 82–102)
MONOCYTES # BLD: 0.7 K/UL (ref 0.1–1.3)
MONOCYTES NFR BLD: 9 % (ref 4–12)
NEUTS SEG # BLD: 4.1 K/UL (ref 1.7–8.2)
NEUTS SEG NFR BLD: 53 % (ref 43–78)
NITRITE UR QL STRIP.AUTO: NEGATIVE
NRBC # BLD: 0 K/UL (ref 0–0.2)
OTHER OBSERVATIONS: ABNORMAL
PH UR STRIP: 6 (ref 5–9)
PLATELET # BLD AUTO: 409 K/UL (ref 150–450)
PMV BLD AUTO: 9.3 FL (ref 9.4–12.3)
POTASSIUM SERPL-SCNC: 3.9 MMOL/L (ref 3.5–5.1)
PROT SERPL-MCNC: 7.1 G/DL (ref 6.3–8.2)
PROT UR STRIP-MCNC: NEGATIVE MG/DL
RBC # BLD AUTO: 4.4 M/UL (ref 4.05–5.2)
RBC #/AREA URNS HPF: ABNORMAL /HPF
SODIUM SERPL-SCNC: 140 MMOL/L (ref 136–146)
SP GR UR REFRACTOMETRY: 1.02 (ref 1–1.02)
TRIGL SERPL-MCNC: 160 MG/DL (ref 35–150)
TSH, 3RD GENERATION: 1.5 UIU/ML (ref 0.36–3.74)
UROBILINOGEN UR QL STRIP.AUTO: 0.2 EU/DL (ref 0.2–1)
VLDLC SERPL CALC-MCNC: 32 MG/DL (ref 6–23)
WBC # BLD AUTO: 7.5 K/UL (ref 4.3–11.1)
WBC URNS QL MICRO: ABNORMAL /HPF

## 2024-04-19 PROCEDURE — 99396 PREV VISIT EST AGE 40-64: CPT | Performed by: INTERNAL MEDICINE

## 2024-04-19 RX ORDER — OMEPRAZOLE 20 MG/1
20 CAPSULE, DELAYED RELEASE ORAL DAILY
Qty: 90 CAPSULE | Refills: 3 | Status: SHIPPED | OUTPATIENT
Start: 2024-04-19

## 2024-04-19 ASSESSMENT — ENCOUNTER SYMPTOMS
ABDOMINAL PAIN: 0
SHORTNESS OF BREATH: 0
FACIAL SWELLING: 0
EYE REDNESS: 0

## 2024-04-19 NOTE — PROGRESS NOTES
Medication Sig Dispense Refill    omeprazole (PRILOSEC) 20 MG delayed release capsule Take 1 capsule by mouth daily 90 capsule 3    FLUoxetine (PROZAC) 20 MG capsule Take 1 capsule by mouth daily 30 capsule 2    prazosin (MINIPRESS) 1 MG capsule Take 1 capsule at night for one week; may increase by 1 mg/week as tolerated, but not to exceed 3 mg nightly 90 capsule 2    mirtazapine (REMERON) 45 MG tablet Take 1 tablet by mouth nightly 90 tablet 1    estradiol (ESTRACE) 0.1 MG/GM vaginal cream Place 2 g vaginally daily      atorvastatin (LIPITOR) 40 MG tablet Take 1 tablet by mouth at bedtime 90 tablet 1     No current facility-administered medications for this visit.     Social History:  Social History     Tobacco Use    Smoking status: Never    Smokeless tobacco: Never   Substance Use Topics    Alcohol use: No     Family History  Family History   Problem Relation Age of Onset    Stroke Mother     Depression Mother     Dementia Father     Stroke Father     No Known Problems Maternal Grandmother     No Known Problems Brother     No Known Problems Maternal Grandfather     Stroke Paternal Grandmother     Stroke Paternal Grandfather     Cancer Paternal Aunt     Stroke Paternal Uncle        Review of Systems   Constitutional:  Negative for chills, fatigue and fever.   HENT:  Negative for facial swelling.    Eyes:  Negative for redness.   Respiratory:  Negative for shortness of breath.    Cardiovascular:  Negative for chest pain.   Gastrointestinal:  Negative for abdominal pain.   Genitourinary:  Negative for flank pain.   Musculoskeletal:  Negative for gait problem.   Skin:  Negative for rash.   Neurological:  Negative for speech difficulty.   Psychiatric/Behavioral:  Negative for dysphoric mood.          Vital Signs  /82 (Site: Left Upper Arm, Position: Sitting, Cuff Size: Large Adult)   Pulse 75   Temp 98.1 °F (36.7 °C) (Temporal)   Ht 1.651 m (5' 5\")   Wt 68 kg (150 lb)   SpO2 100%   BMI 24.96 kg/m²   Body

## 2024-05-15 ENCOUNTER — TELEPHONE (OUTPATIENT)
Dept: INTERNAL MEDICINE CLINIC | Facility: CLINIC | Age: 49
End: 2024-05-15

## 2024-05-15 ENCOUNTER — OFFICE VISIT (OUTPATIENT)
Dept: INTERNAL MEDICINE CLINIC | Facility: CLINIC | Age: 49
End: 2024-05-15
Payer: COMMERCIAL

## 2024-05-15 VITALS
WEIGHT: 159 LBS | HEIGHT: 65 IN | BODY MASS INDEX: 26.49 KG/M2 | DIASTOLIC BLOOD PRESSURE: 77 MMHG | HEART RATE: 82 BPM | SYSTOLIC BLOOD PRESSURE: 127 MMHG | TEMPERATURE: 97.7 F | OXYGEN SATURATION: 99 %

## 2024-05-15 DIAGNOSIS — H93.19 TINNITUS, UNSPECIFIED LATERALITY: Primary | ICD-10-CM

## 2024-05-15 PROCEDURE — 99212 OFFICE O/P EST SF 10 MIN: CPT | Performed by: INTERNAL MEDICINE

## 2024-05-15 SDOH — ECONOMIC STABILITY: FOOD INSECURITY: WITHIN THE PAST 12 MONTHS, YOU WORRIED THAT YOUR FOOD WOULD RUN OUT BEFORE YOU GOT MONEY TO BUY MORE.: NEVER TRUE

## 2024-05-15 SDOH — ECONOMIC STABILITY: FOOD INSECURITY: WITHIN THE PAST 12 MONTHS, THE FOOD YOU BOUGHT JUST DIDN'T LAST AND YOU DIDN'T HAVE MONEY TO GET MORE.: NEVER TRUE

## 2024-05-15 SDOH — ECONOMIC STABILITY: INCOME INSECURITY: HOW HARD IS IT FOR YOU TO PAY FOR THE VERY BASICS LIKE FOOD, HOUSING, MEDICAL CARE, AND HEATING?: NOT HARD AT ALL

## 2024-05-15 ASSESSMENT — ANXIETY QUESTIONNAIRES
7. FEELING AFRAID AS IF SOMETHING AWFUL MIGHT HAPPEN: NOT AT ALL
2. NOT BEING ABLE TO STOP OR CONTROL WORRYING: NOT AT ALL
6. BECOMING EASILY ANNOYED OR IRRITABLE: NOT AT ALL
5. BEING SO RESTLESS THAT IT IS HARD TO SIT STILL: NOT AT ALL
4. TROUBLE RELAXING: NOT AT ALL
GAD7 TOTAL SCORE: 0
IF YOU CHECKED OFF ANY PROBLEMS ON THIS QUESTIONNAIRE, HOW DIFFICULT HAVE THESE PROBLEMS MADE IT FOR YOU TO DO YOUR WORK, TAKE CARE OF THINGS AT HOME, OR GET ALONG WITH OTHER PEOPLE: NOT DIFFICULT AT ALL
1. FEELING NERVOUS, ANXIOUS, OR ON EDGE: NOT AT ALL
3. WORRYING TOO MUCH ABOUT DIFFERENT THINGS: NOT AT ALL

## 2024-05-15 ASSESSMENT — PATIENT HEALTH QUESTIONNAIRE - PHQ9
SUM OF ALL RESPONSES TO PHQ9 QUESTIONS 1 & 2: 0
SUM OF ALL RESPONSES TO PHQ QUESTIONS 1-9: 0
4. FEELING TIRED OR HAVING LITTLE ENERGY: NOT AT ALL
SUM OF ALL RESPONSES TO PHQ QUESTIONS 1-9: 0
SUM OF ALL RESPONSES TO PHQ QUESTIONS 1-9: 0
1. LITTLE INTEREST OR PLEASURE IN DOING THINGS: NOT AT ALL
7. TROUBLE CONCENTRATING ON THINGS, SUCH AS READING THE NEWSPAPER OR WATCHING TELEVISION: NOT AT ALL
6. FEELING BAD ABOUT YOURSELF - OR THAT YOU ARE A FAILURE OR HAVE LET YOURSELF OR YOUR FAMILY DOWN: NOT AT ALL
3. TROUBLE FALLING OR STAYING ASLEEP: NOT AT ALL
9. THOUGHTS THAT YOU WOULD BE BETTER OFF DEAD, OR OF HURTING YOURSELF: NOT AT ALL
10. IF YOU CHECKED OFF ANY PROBLEMS, HOW DIFFICULT HAVE THESE PROBLEMS MADE IT FOR YOU TO DO YOUR WORK, TAKE CARE OF THINGS AT HOME, OR GET ALONG WITH OTHER PEOPLE: NOT DIFFICULT AT ALL
8. MOVING OR SPEAKING SO SLOWLY THAT OTHER PEOPLE COULD HAVE NOTICED. OR THE OPPOSITE, BEING SO FIGETY OR RESTLESS THAT YOU HAVE BEEN MOVING AROUND A LOT MORE THAN USUAL: NOT AT ALL
5. POOR APPETITE OR OVEREATING: NOT AT ALL
2. FEELING DOWN, DEPRESSED OR HOPELESS: NOT AT ALL
SUM OF ALL RESPONSES TO PHQ QUESTIONS 1-9: 0

## 2024-05-15 NOTE — TELEPHONE ENCOUNTER
Called pt regarding concerns expressed in regard to 4/19 visit and visit ending shorter than she'd have liked. Advised that due to it being a physical, sometimes there is time to address problem-focused issues, but often providers will ask patients to schedule a problem-focused visit to discuss issues in more details. Patient expressed understanding and stated that she still is having ringing in her ears and would like to be seen. Advised of openings this afternoon, and pt accepted. Appt scheduled.

## 2024-05-15 NOTE — PROGRESS NOTES
[facility]  Jamey Hernandes M.D.  Internal Medicine  [address] [place] [address]  Office : [phone]  Fax : [phone]    Chief Complaint   Patient presents with    Tinnitus     Bilateral ear ringing off and on since January        History of Present Illness:  Jessenia Lance is a 48 y.o. female.  HPI    Tinnitus  Reports ringing in the ears intermittently  since January.  Denies any loud noise exposure.  Has not noticed any hearing loss.  She declines referral for audiology referral.  She declined having any other issues.  She was told the visit was concluded just like before    Past Medical History:  Past Medical History:   Diagnosis Date    Anxiety     Depression     GERD (gastroesophageal reflux disease)     HPV (human papilloma virus) anogenital infection     Hyperlipidemia     Insomnia     Premature ovarian failure      Past Surgical History:  Past Surgical History:   Procedure Laterality Date    BREAST BIOPSY Right     2019?    CHOLECYSTECTOMY  03/2020    OTHER SURGICAL HISTORY      ERCP for gallstone pancreatiis     Allergies:   No Known Allergies  Medications:   Current Outpatient Medications   Medication Sig Dispense Refill    omeprazole (PRILOSEC) 20 MG delayed release capsule Take 1 capsule by mouth daily 90 capsule 3    FLUoxetine (PROZAC) 20 MG capsule Take 1 capsule by mouth daily 30 capsule 2    prazosin (MINIPRESS) 1 MG capsule Take 1 capsule at night for one week; may increase by 1 mg/week as tolerated, but not to exceed 3 mg nightly 90 capsule 2    mirtazapine (REMERON) 45 MG tablet Take 1 tablet by mouth nightly 90 tablet 1     No current facility-administered medications for this visit.     Social History:  Social History     Tobacco Use    Smoking status: Never    Smokeless tobacco: Never   Substance Use Topics    Alcohol use: No     Family History  Family History   Problem Relation Age of Onset    Stroke Mother     Depression Mother

## 2024-06-03 ENCOUNTER — OFFICE VISIT (OUTPATIENT)
Dept: BEHAVIORAL/MENTAL HEALTH CLINIC | Age: 49
End: 2024-06-03
Payer: COMMERCIAL

## 2024-06-03 VITALS
DIASTOLIC BLOOD PRESSURE: 86 MMHG | OXYGEN SATURATION: 96 % | HEIGHT: 65 IN | HEART RATE: 90 BPM | SYSTOLIC BLOOD PRESSURE: 132 MMHG | BODY MASS INDEX: 26.46 KG/M2

## 2024-06-03 DIAGNOSIS — F43.10 COMPLEX POSTTRAUMATIC STRESS DISORDER: Primary | ICD-10-CM

## 2024-06-03 DIAGNOSIS — F33.42 MDD (MAJOR DEPRESSIVE DISORDER), RECURRENT, IN FULL REMISSION (HCC): ICD-10-CM

## 2024-06-03 PROCEDURE — 99214 OFFICE O/P EST MOD 30 MIN: CPT | Performed by: STUDENT IN AN ORGANIZED HEALTH CARE EDUCATION/TRAINING PROGRAM

## 2024-06-03 RX ORDER — PRAZOSIN HYDROCHLORIDE 1 MG/1
1 CAPSULE ORAL NIGHTLY
Qty: 90 CAPSULE | Refills: 1 | Status: SHIPPED | OUTPATIENT
Start: 2024-06-03

## 2024-06-03 RX ORDER — MIRTAZAPINE 45 MG/1
45 TABLET, FILM COATED ORAL NIGHTLY
Qty: 90 TABLET | Refills: 1 | Status: SHIPPED | OUTPATIENT
Start: 2024-06-03

## 2024-06-03 RX ORDER — FLUOXETINE HYDROCHLORIDE 20 MG/1
20 CAPSULE ORAL DAILY
Qty: 90 CAPSULE | Refills: 1 | Status: SHIPPED | OUTPATIENT
Start: 2024-06-03

## 2024-06-03 NOTE — PROGRESS NOTES
Diagnosis Date    Anxiety     Depression     GERD (gastroesophageal reflux disease)     HPV (human papilloma virus) anogenital infection     Hyperlipidemia     Insomnia     Premature ovarian failure           Allergies:    Patient has no known allergies.         Current Home Medications:    Current Outpatient Medications   Medication Instructions    FLUoxetine (PROZAC) 20 mg, Oral, DAILY    mirtazapine (REMERON) 45 mg, Oral, NIGHTLY    omeprazole (PRILOSEC) 20 mg, Oral, DAILY    prazosin (MINIPRESS) 1 mg, Oral, NIGHTLY         PDMP:  Last reviewed: 11/1/23    06/22/2023 03/10/2023 1   Lorazepam 1 Mg Tablet  90.00 30 Mi Tra [id number] [facility] (0397) 2/2 3.00 [place] Comm [place]  05/27/2023 03/10/2023 1   Temazepam 30 Mg Capsule  30.00 30 Mi Tra [id number] [facility] (0397) 2/2 1.50 [place]  05/01/2023 03/10/2023 1   Lorazepam 1 Mg Tablet  90.00 30 Mi Tra [id number] [facility] (0397) 1/2 3.00 [place]    - I have checked the [place] PDMP website prior to prescribing a controlled substance.        Vital Signs:    Temp Readings from Last 3 Encounters:   05/15/24 97.7 °F (36.5 °C) (Temporal)   04/19/24 98.1 °F (36.7 °C) (Temporal)   03/25/24 98 °F (36.7 °C)       BP Readings from Last 3 Encounters:   06/03/24 132/86   05/15/24 127/77   04/19/24 116/82       Pulse Readings from Last 3 Encounters:   06/03/24 90   05/15/24 82   04/19/24 75          Lab Results:     Lab Results   Component Value Date/Time    WBC 7.5 04/19/2024 11:24 AM    HGB 12.6 04/19/2024 11:24 AM    HCT 39.7 04/19/2024 11:24 AM    MCV 90.2 04/19/2024 11:24 AM    MCH 28.6 04/19/2024 11:24 AM    MCHC 31.7 04/19/2024 11:24 AM    RDW 12.1 04/19/2024 11:24 AM    MPV 9.3 04/19/2024 11:24 AM    MONOPCT 9 04/19/2024 11:24 AM    EOSPCT 3 04/19/2024 11:24 AM    BASOPCT 1 04/19/2024 11:24 AM    DIFFTYPE AUTOMATED 04/19/2024 11:24 AM         Lab Results   Component Value Date    TRIG 160 (H) 04/19/2024    HDL 53 04/19/2024     (H) 04/19/2024    CHOL 229 (H) 04/19/2024

## 2024-07-23 ENCOUNTER — TELEPHONE (OUTPATIENT)
Dept: BEHAVIORAL/MENTAL HEALTH CLINIC | Age: 49
End: 2024-07-23

## 2024-07-23 NOTE — TELEPHONE ENCOUNTER
Patient cancelled new patient appointment with Red Valenzuela stating she does not want to do therapy.

## 2024-09-03 ENCOUNTER — OFFICE VISIT (OUTPATIENT)
Dept: BEHAVIORAL/MENTAL HEALTH CLINIC | Age: 49
End: 2024-09-03
Payer: COMMERCIAL

## 2024-09-03 VITALS — OXYGEN SATURATION: 98 % | HEART RATE: 86 BPM | DIASTOLIC BLOOD PRESSURE: 74 MMHG | SYSTOLIC BLOOD PRESSURE: 116 MMHG

## 2024-09-03 DIAGNOSIS — F43.10 COMPLEX POSTTRAUMATIC STRESS DISORDER: Primary | ICD-10-CM

## 2024-09-03 DIAGNOSIS — F33.42 MDD (MAJOR DEPRESSIVE DISORDER), RECURRENT, IN FULL REMISSION (HCC): ICD-10-CM

## 2024-09-03 PROCEDURE — 99214 OFFICE O/P EST MOD 30 MIN: CPT | Performed by: STUDENT IN AN ORGANIZED HEALTH CARE EDUCATION/TRAINING PROGRAM

## 2024-09-03 RX ORDER — MIRTAZAPINE 45 MG/1
45 TABLET, FILM COATED ORAL NIGHTLY
Qty: 90 TABLET | Refills: 1 | Status: SHIPPED | OUTPATIENT
Start: 2024-09-03

## 2024-09-03 RX ORDER — PRAZOSIN HYDROCHLORIDE 1 MG/1
1 CAPSULE ORAL NIGHTLY
Qty: 90 CAPSULE | Refills: 1 | Status: SHIPPED | OUTPATIENT
Start: 2024-09-03
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
## 2024-12-03 ENCOUNTER — OFFICE VISIT (OUTPATIENT)
Dept: BEHAVIORAL/MENTAL HEALTH CLINIC | Age: 49
End: 2024-12-03
Payer: COMMERCIAL

## 2024-12-03 VITALS — SYSTOLIC BLOOD PRESSURE: 118 MMHG | DIASTOLIC BLOOD PRESSURE: 78 MMHG | HEART RATE: 76 BPM

## 2024-12-03 DIAGNOSIS — F43.10 COMPLEX POSTTRAUMATIC STRESS DISORDER: Primary | ICD-10-CM

## 2024-12-03 DIAGNOSIS — F33.42 MDD (MAJOR DEPRESSIVE DISORDER), RECURRENT, IN FULL REMISSION (HCC): ICD-10-CM

## 2024-12-03 PROCEDURE — 99214 OFFICE O/P EST MOD 30 MIN: CPT | Performed by: STUDENT IN AN ORGANIZED HEALTH CARE EDUCATION/TRAINING PROGRAM

## 2024-12-03 RX ORDER — MIRTAZAPINE 45 MG/1
45 TABLET, FILM COATED ORAL NIGHTLY
Qty: 90 TABLET | Refills: 1 | Status: SHIPPED | OUTPATIENT
Start: 2024-12-03

## 2024-12-03 RX ORDER — PRAZOSIN HYDROCHLORIDE 1 MG/1
CAPSULE ORAL
Qty: 180 CAPSULE | Refills: 1 | Status: SHIPPED | OUTPATIENT
Start: 2024-12-03

## 2024-12-03 ASSESSMENT — PATIENT HEALTH QUESTIONNAIRE - PHQ9
5. POOR APPETITE OR OVEREATING: NOT AT ALL
1. LITTLE INTEREST OR PLEASURE IN DOING THINGS: SEVERAL DAYS
10. IF YOU CHECKED OFF ANY PROBLEMS, HOW DIFFICULT HAVE THESE PROBLEMS MADE IT FOR YOU TO DO YOUR WORK, TAKE CARE OF THINGS AT HOME, OR GET ALONG WITH OTHER PEOPLE: SOMEWHAT DIFFICULT
4. FEELING TIRED OR HAVING LITTLE ENERGY: NOT AT ALL
SUM OF ALL RESPONSES TO PHQ QUESTIONS 1-9: 3
SUM OF ALL RESPONSES TO PHQ QUESTIONS 1-9: 3
6. FEELING BAD ABOUT YOURSELF - OR THAT YOU ARE A FAILURE OR HAVE LET YOURSELF OR YOUR FAMILY DOWN: SEVERAL DAYS
3. TROUBLE FALLING OR STAYING ASLEEP: NOT AT ALL
7. TROUBLE CONCENTRATING ON THINGS, SUCH AS READING THE NEWSPAPER OR WATCHING TELEVISION: NOT AT ALL
SUM OF ALL RESPONSES TO PHQ9 QUESTIONS 1 & 2: 2
2. FEELING DOWN, DEPRESSED OR HOPELESS: SEVERAL DAYS
8. MOVING OR SPEAKING SO SLOWLY THAT OTHER PEOPLE COULD HAVE NOTICED. OR THE OPPOSITE, BEING SO FIGETY OR RESTLESS THAT YOU HAVE BEEN MOVING AROUND A LOT MORE THAN USUAL: NOT AT ALL
9. THOUGHTS THAT YOU WOULD BE BETTER OFF DEAD, OR OF HURTING YOURSELF: NOT AT ALL
SUM OF ALL RESPONSES TO PHQ QUESTIONS 1-9: 3
SUM OF ALL RESPONSES TO PHQ QUESTIONS 1-9: 3
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
## 2025-01-13 NOTE — PROGRESS NOTES
Did verbal education with pt about dressing changes, monitoring output, emptying and charging NEMESIO drain. Pt voiced understanding and ready to demonstrate in am. Little output from drain at this time. Go for blood tests as directed. Your doctor will do lab tests at regular visits to monitor the effects of this medicine. Please follow up with your doctor and keep your health care provider appointments.

## 2025-03-30 ASSESSMENT — COLUMBIA-SUICIDE SEVERITY RATING SCALE - C-SSRS
5. IN THE PAST MONTH, HAVE YOU STARTED TO WORK OUT OR WORKED OUT THE DETAILS OF HOW TO KILL YOURSELF? DO YOU INTEND TO CARRY OUT THIS PLAN?: NO
3. IN THE PAST MONTH, HAVE YOU BEEN THINKING ABOUT HOW YOU MIGHT KILL YOURSELF?: NO
7. DID THIS OCCUR IN THE LAST THREE MONTHS: NO
6. IN YOUR LIFETIME, HAVE YOU EVER DONE ANYTHING, STARTED TO DO ANYTHING, OR PREPARED TO DO ANYTHING TO END YOUR LIFE?: YES
1. IN THE PAST MONTH, HAVE YOU WISHED YOU WERE DEAD OR WISHED YOU COULD GO TO SLEEP AND NOT WAKE UP?: YES
2. IN THE PAST MONTH, HAVE YOU ACTUALLY HAD ANY THOUGHTS OF KILLING YOURSELF?: YES
4. IN THE PAST MONTH, HAVE YOU HAD THESE THOUGHTS AND HAD SOME INTENTION OF ACTING ON THEM?: NO

## 2025-03-30 ASSESSMENT — PATIENT HEALTH QUESTIONNAIRE - PHQ9
SUM OF ALL RESPONSES TO PHQ QUESTIONS 1-9: 4
SUM OF ALL RESPONSES TO PHQ QUESTIONS 1-9: 4
1. LITTLE INTEREST OR PLEASURE IN DOING THINGS: NOT AT ALL
1. LITTLE INTEREST OR PLEASURE IN DOING THINGS: NOT AT ALL
6. FEELING BAD ABOUT YOURSELF - OR THAT YOU ARE A FAILURE OR HAVE LET YOURSELF OR YOUR FAMILY DOWN: SEVERAL DAYS
4. FEELING TIRED OR HAVING LITTLE ENERGY: SEVERAL DAYS
3. TROUBLE FALLING OR STAYING ASLEEP: NOT AT ALL
SUM OF ALL RESPONSES TO PHQ QUESTIONS 1-9: 4
2. FEELING DOWN, DEPRESSED OR HOPELESS: SEVERAL DAYS
7. TROUBLE CONCENTRATING ON THINGS, SUCH AS READING THE NEWSPAPER OR WATCHING TELEVISION: NOT AT ALL
2. FEELING DOWN, DEPRESSED OR HOPELESS: SEVERAL DAYS
10. IF YOU CHECKED OFF ANY PROBLEMS, HOW DIFFICULT HAVE THESE PROBLEMS MADE IT FOR YOU TO DO YOUR WORK, TAKE CARE OF THINGS AT HOME, OR GET ALONG WITH OTHER PEOPLE: NOT DIFFICULT AT ALL
3. TROUBLE FALLING OR STAYING ASLEEP: NOT AT ALL
SUM OF ALL RESPONSES TO PHQ QUESTIONS 1-9: 3
8. MOVING OR SPEAKING SO SLOWLY THAT OTHER PEOPLE COULD HAVE NOTICED. OR THE OPPOSITE - BEING SO FIDGETY OR RESTLESS THAT YOU HAVE BEEN MOVING AROUND A LOT MORE THAN USUAL: NOT AT ALL
9. THOUGHTS THAT YOU WOULD BE BETTER OFF DEAD, OR OF HURTING YOURSELF: SEVERAL DAYS
5. POOR APPETITE OR OVEREATING: NOT AT ALL
7. TROUBLE CONCENTRATING ON THINGS, SUCH AS READING THE NEWSPAPER OR WATCHING TELEVISION: NOT AT ALL
8. MOVING OR SPEAKING SO SLOWLY THAT OTHER PEOPLE COULD HAVE NOTICED. OR THE OPPOSITE, BEING SO FIGETY OR RESTLESS THAT YOU HAVE BEEN MOVING AROUND A LOT MORE THAN USUAL: NOT AT ALL
9. THOUGHTS THAT YOU WOULD BE BETTER OFF DEAD, OR OF HURTING YOURSELF: SEVERAL DAYS
5. POOR APPETITE OR OVEREATING: NOT AT ALL
10. IF YOU CHECKED OFF ANY PROBLEMS, HOW DIFFICULT HAVE THESE PROBLEMS MADE IT FOR YOU TO DO YOUR WORK, TAKE CARE OF THINGS AT HOME, OR GET ALONG WITH OTHER PEOPLE: NOT DIFFICULT AT ALL
SUM OF ALL RESPONSES TO PHQ QUESTIONS 1-9: 4
4. FEELING TIRED OR HAVING LITTLE ENERGY: SEVERAL DAYS
6. FEELING BAD ABOUT YOURSELF - OR THAT YOU ARE A FAILURE OR HAVE LET YOURSELF OR YOUR FAMILY DOWN: SEVERAL DAYS

## 2025-03-31 ENCOUNTER — OFFICE VISIT (OUTPATIENT)
Dept: BEHAVIORAL/MENTAL HEALTH CLINIC | Age: 50
End: 2025-03-31
Payer: COMMERCIAL

## 2025-03-31 VITALS — DIASTOLIC BLOOD PRESSURE: 78 MMHG | SYSTOLIC BLOOD PRESSURE: 122 MMHG | OXYGEN SATURATION: 96 % | HEART RATE: 86 BPM

## 2025-03-31 DIAGNOSIS — F43.10 COMPLEX POSTTRAUMATIC STRESS DISORDER: Primary | ICD-10-CM

## 2025-03-31 DIAGNOSIS — F33.42 MDD (MAJOR DEPRESSIVE DISORDER), RECURRENT, IN FULL REMISSION: ICD-10-CM

## 2025-03-31 PROCEDURE — 99214 OFFICE O/P EST MOD 30 MIN: CPT | Performed by: STUDENT IN AN ORGANIZED HEALTH CARE EDUCATION/TRAINING PROGRAM

## 2025-03-31 RX ORDER — HYDROXYZINE HYDROCHLORIDE 25 MG/1
TABLET, FILM COATED ORAL
Qty: 60 TABLET | Refills: 2 | Status: SHIPPED | OUTPATIENT
Start: 2025-03-31
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
## 2025-04-29 DIAGNOSIS — K21.9 GASTROESOPHAGEAL REFLUX DISEASE, UNSPECIFIED WHETHER ESOPHAGITIS PRESENT: ICD-10-CM

## 2025-04-29 RX ORDER — OMEPRAZOLE 20 MG/1
20 CAPSULE, DELAYED RELEASE ORAL DAILY
Qty: 30 CAPSULE | Refills: 0 | OUTPATIENT
Start: 2025-04-29

## 2025-05-31 ASSESSMENT — ANXIETY QUESTIONNAIRES
1. FEELING NERVOUS, ANXIOUS, OR ON EDGE: SEVERAL DAYS
IF YOU CHECKED OFF ANY PROBLEMS ON THIS QUESTIONNAIRE, HOW DIFFICULT HAVE THESE PROBLEMS MADE IT FOR YOU TO DO YOUR WORK, TAKE CARE OF THINGS AT HOME, OR GET ALONG WITH OTHER PEOPLE: SOMEWHAT DIFFICULT
5. BEING SO RESTLESS THAT IT IS HARD TO SIT STILL: NOT AT ALL
GAD7 TOTAL SCORE: 5
4. TROUBLE RELAXING: NOT AT ALL
7. FEELING AFRAID AS IF SOMETHING AWFUL MIGHT HAPPEN: SEVERAL DAYS
1. FEELING NERVOUS, ANXIOUS, OR ON EDGE: SEVERAL DAYS
IF YOU CHECKED OFF ANY PROBLEMS ON THIS QUESTIONNAIRE, HOW DIFFICULT HAVE THESE PROBLEMS MADE IT FOR YOU TO DO YOUR WORK, TAKE CARE OF THINGS AT HOME, OR GET ALONG WITH OTHER PEOPLE: SOMEWHAT DIFFICULT
3. WORRYING TOO MUCH ABOUT DIFFERENT THINGS: SEVERAL DAYS
6. BECOMING EASILY ANNOYED OR IRRITABLE: SEVERAL DAYS
2. NOT BEING ABLE TO STOP OR CONTROL WORRYING: SEVERAL DAYS
3. WORRYING TOO MUCH ABOUT DIFFERENT THINGS: SEVERAL DAYS
7. FEELING AFRAID AS IF SOMETHING AWFUL MIGHT HAPPEN: SEVERAL DAYS
2. NOT BEING ABLE TO STOP OR CONTROL WORRYING: SEVERAL DAYS
5. BEING SO RESTLESS THAT IT IS HARD TO SIT STILL: NOT AT ALL
4. TROUBLE RELAXING: NOT AT ALL
6. BECOMING EASILY ANNOYED OR IRRITABLE: SEVERAL DAYS

## 2025-05-31 ASSESSMENT — PATIENT HEALTH QUESTIONNAIRE - PHQ9
4. FEELING TIRED OR HAVING LITTLE ENERGY: SEVERAL DAYS
10. IF YOU CHECKED OFF ANY PROBLEMS, HOW DIFFICULT HAVE THESE PROBLEMS MADE IT FOR YOU TO DO YOUR WORK, TAKE CARE OF THINGS AT HOME, OR GET ALONG WITH OTHER PEOPLE: NOT DIFFICULT AT ALL
4. FEELING TIRED OR HAVING LITTLE ENERGY: SEVERAL DAYS
2. FEELING DOWN, DEPRESSED OR HOPELESS: SEVERAL DAYS
8. MOVING OR SPEAKING SO SLOWLY THAT OTHER PEOPLE COULD HAVE NOTICED. OR THE OPPOSITE - BEING SO FIDGETY OR RESTLESS THAT YOU HAVE BEEN MOVING AROUND A LOT MORE THAN USUAL: NOT AT ALL
10. IF YOU CHECKED OFF ANY PROBLEMS, HOW DIFFICULT HAVE THESE PROBLEMS MADE IT FOR YOU TO DO YOUR WORK, TAKE CARE OF THINGS AT HOME, OR GET ALONG WITH OTHER PEOPLE: NOT DIFFICULT AT ALL
1. LITTLE INTEREST OR PLEASURE IN DOING THINGS: NOT AT ALL
1. LITTLE INTEREST OR PLEASURE IN DOING THINGS: NOT AT ALL
SUM OF ALL RESPONSES TO PHQ QUESTIONS 1-9: 4
SUM OF ALL RESPONSES TO PHQ QUESTIONS 1-9: 4
7. TROUBLE CONCENTRATING ON THINGS, SUCH AS READING THE NEWSPAPER OR WATCHING TELEVISION: NOT AT ALL
8. MOVING OR SPEAKING SO SLOWLY THAT OTHER PEOPLE COULD HAVE NOTICED. OR THE OPPOSITE, BEING SO FIGETY OR RESTLESS THAT YOU HAVE BEEN MOVING AROUND A LOT MORE THAN USUAL: NOT AT ALL
3. TROUBLE FALLING OR STAYING ASLEEP: NOT AT ALL
6. FEELING BAD ABOUT YOURSELF - OR THAT YOU ARE A FAILURE OR HAVE LET YOURSELF OR YOUR FAMILY DOWN: SEVERAL DAYS
SUM OF ALL RESPONSES TO PHQ QUESTIONS 1-9: 4
5. POOR APPETITE OR OVEREATING: SEVERAL DAYS
SUM OF ALL RESPONSES TO PHQ QUESTIONS 1-9: 4
7. TROUBLE CONCENTRATING ON THINGS, SUCH AS READING THE NEWSPAPER OR WATCHING TELEVISION: NOT AT ALL
6. FEELING BAD ABOUT YOURSELF - OR THAT YOU ARE A FAILURE OR HAVE LET YOURSELF OR YOUR FAMILY DOWN: SEVERAL DAYS
9. THOUGHTS THAT YOU WOULD BE BETTER OFF DEAD, OR OF HURTING YOURSELF: NOT AT ALL
SUM OF ALL RESPONSES TO PHQ QUESTIONS 1-9: 4
5. POOR APPETITE OR OVEREATING: SEVERAL DAYS
3. TROUBLE FALLING OR STAYING ASLEEP: NOT AT ALL
9. THOUGHTS THAT YOU WOULD BE BETTER OFF DEAD, OR OF HURTING YOURSELF: NOT AT ALL
2. FEELING DOWN, DEPRESSED OR HOPELESS: SEVERAL DAYS

## 2025-06-03 ENCOUNTER — OFFICE VISIT (OUTPATIENT)
Dept: BEHAVIORAL/MENTAL HEALTH CLINIC | Age: 50
End: 2025-06-03
Payer: COMMERCIAL

## 2025-06-03 VITALS — OXYGEN SATURATION: 98 % | SYSTOLIC BLOOD PRESSURE: 122 MMHG | DIASTOLIC BLOOD PRESSURE: 84 MMHG | HEART RATE: 77 BPM

## 2025-06-03 DIAGNOSIS — F33.42 MDD (MAJOR DEPRESSIVE DISORDER), RECURRENT, IN FULL REMISSION: ICD-10-CM

## 2025-06-03 DIAGNOSIS — F43.10 COMPLEX POSTTRAUMATIC STRESS DISORDER: Primary | ICD-10-CM

## 2025-06-03 PROCEDURE — 99214 OFFICE O/P EST MOD 30 MIN: CPT | Performed by: STUDENT IN AN ORGANIZED HEALTH CARE EDUCATION/TRAINING PROGRAM

## 2025-06-03 RX ORDER — PRAZOSIN HYDROCHLORIDE 1 MG/1
CAPSULE ORAL
Qty: 180 CAPSULE | Refills: 1 | Status: SHIPPED | OUTPATIENT
Start: 2025-06-03

## 2025-06-03 RX ORDER — MIRTAZAPINE 45 MG/1
45 TABLET, FILM COATED ORAL NIGHTLY
Qty: 90 TABLET | Refills: 1 | Status: SHIPPED | OUTPATIENT
Start: 2025-06-03
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
## (undated) DEVICE — CONTAINER SPEC FRMLN 120ML --

## (undated) DEVICE — STANDARD HYPODERMIC NEEDLE,POLYPROPYLENE HUB: Brand: MONOJECT

## (undated) DEVICE — SYR 3ML LL TIP 1/10ML GRAD --

## (undated) DEVICE — SPHINCTEROTOME: Brand: JAGTOME RX 44

## (undated) DEVICE — GENERAL LAPAROSCOPY: Brand: MEDLINE INDUSTRIES, INC.

## (undated) DEVICE — DEVICE LCK BILI RAP EXCHG OLPS --

## (undated) DEVICE — 3M™ TEGADERM™ TRANSPARENT FILM DRESSING FRAME STYLE, 1624W, 2-3/8 IN X 2-3/4 IN (6 CM X 7 CM), 100/CT 4CT/CASE: Brand: 3M™ TEGADERM™

## (undated) DEVICE — DRAIN SURG 19FR 100% SIL RADPQ RND CHN FULL FLUT

## (undated) DEVICE — LOGICUT SCISSOR LENGTH 320MM: Brand: LOGI - LAPAROSCOPIC INSTRUMENT SYSTEM

## (undated) DEVICE — APPLIER CLP M L L11.4IN DIA10MM ENDOSCP ROT MULT FOR LIG

## (undated) DEVICE — TROCAR: Brand: KII FIOS FIRST ENTRY

## (undated) DEVICE — APPLIER LIG CLP L13IN 10MM PSTL GRP CONTAIN 15 TI L CLP

## (undated) DEVICE — CANNULA NSL ORAL AD FOR CAPNOFLEX CO2 O2 AIRLFE

## (undated) DEVICE — SYR LR LCK 1ML GRAD NSAF 30ML --

## (undated) DEVICE — (D)SYR 10ML 1/5ML GRAD NSAF -- PKGING CHANGE USE ITEM 338027

## (undated) DEVICE — NDL PRT INJ NSAF BLNT 18GX1.5 --

## (undated) DEVICE — BLADELESS OPTICAL TROCAR WITH FIXATION CANNULA: Brand: VERSAPORT

## (undated) DEVICE — RETRIEVAL BALLOON CATHETER: Brand: EXTRACTOR™ PRO RX

## (undated) DEVICE — BAG SPEC REM 224ML W4XL6IN DIA10MM 1 HND GYN DISP ENDOPCH

## (undated) DEVICE — KENDALL RADIOLUCENT FOAM MONITORING ELECTRODE RECTANGULAR SHAPE: Brand: KENDALL

## (undated) DEVICE — SYR 10ML LUER LOK 1/5ML GRAD --

## (undated) DEVICE — BLADE SURG NO15 S STL STR DISP GLASSVAN

## (undated) DEVICE — TRAY PREP DRY W/ PREM GLV 2 APPL 6 SPNG 2 UNDPD 1 OVERWRAP

## (undated) DEVICE — TISSUE RETRIEVAL SYSTEM: Brand: INZII RETRIEVAL SYSTEM

## (undated) DEVICE — AGENT HEMSTAT W2XL14IN OXIDIZED REGENERATED CELOS ABSRB FOR

## (undated) DEVICE — TROCAR: Brand: KII® SLEEVE

## (undated) DEVICE — GARMENT,MEDLINE,DVT,INT,CALF,MED, GEN2: Brand: MEDLINE

## (undated) DEVICE — 2, DISPOSABLE SUCTION/IRRIGATOR WITHOUT DISPOSABLE TIP: Brand: STRYKEFLOW

## (undated) DEVICE — BLOCK BITE AD 60FR W/ VELC STRP ADDRESSES MOST PT AND

## (undated) DEVICE — KIT,ANTI FOG,W/SPONGE & FLUID,SOFT PACK: Brand: MEDLINE

## (undated) DEVICE — COVER,LIGHT HANDLE,FLX,2/PK: Brand: MEDLINE INDUSTRIES, INC.

## (undated) DEVICE — 3M™ TEGADERM™ TRANSPARENT FILM DRESSING FRAME STYLE, 1626W, 4 IN X 4-3/4 IN (10 CM X 12 CM), 50/CT 4CT/CASE: Brand: 3M™ TEGADERM™

## (undated) DEVICE — RESERVOIR,SUCTION,100CC,SILICONE: Brand: MEDLINE

## (undated) DEVICE — Device

## (undated) DEVICE — SUT SLK 2-0SH 30IN BLK --

## (undated) DEVICE — SUTURE SZ 0 27IN 5/8 CIR UR-6  TAPER PT VIOLET ABSRB VICRYL J603H

## (undated) DEVICE — BASIC SINGLE BASIN 1-LF: Brand: MEDLINE INDUSTRIES, INC.

## (undated) DEVICE — SMALL BOWL SET-LF: Brand: MEDLINE INDUSTRIES, INC.

## (undated) DEVICE — SYR 5ML 1/5 GRAD LL NSAF LF --

## (undated) DEVICE — AMD ANTIMICROBIAL GAUZE SPONGES,12 PLY USP TYPE VII, 0.2% POLYHEXAMETHYLENE BIGUANIDE HCI (PHMB): Brand: CURITY

## (undated) DEVICE — APPLICATOR COT-TIP WOOD 6IN -- NON STRL

## (undated) DEVICE — DRAPE,LAP,CHOLE,W/TROUGHS,STERILE: Brand: MEDLINE

## (undated) DEVICE — [HIGH FLOW INSUFFLATOR,  DO NOT USE IF PACKAGE IS DAMAGED,  KEEP DRY,  KEEP AWAY FROM SUNLIGHT,  PROTECT FROM HEAT AND RADIOACTIVE SOURCES.]: Brand: PNEUMOSURE

## (undated) DEVICE — TOWEL,OR,DSP,ST,BLUE,DLX,1/PK,80PK/CS: Brand: MEDLINE

## (undated) DEVICE — ADULT SPO2 SENSOR: Brand: NELLCOR

## (undated) DEVICE — AMD ANTIMICROBIAL NON-ADHERENT PAD,0.2% POLYHEXAMETHYLENE BIGUANIDE HCI (PHMB): Brand: TELFA

## (undated) DEVICE — REM POLYHESIVE ADULT PATIENT RETURN ELECTRODE: Brand: VALLEYLAB

## (undated) DEVICE — TROCARS: Brand: KII® BLUNT TIP ACCESS SYSTEM